# Patient Record
Sex: MALE | Race: WHITE | NOT HISPANIC OR LATINO | Employment: OTHER | URBAN - METROPOLITAN AREA
[De-identification: names, ages, dates, MRNs, and addresses within clinical notes are randomized per-mention and may not be internally consistent; named-entity substitution may affect disease eponyms.]

---

## 2019-12-10 ENCOUNTER — HOSPITAL ENCOUNTER (OUTPATIENT)
Dept: GENERAL RADIOLOGY | Facility: HOSPITAL | Age: 54
Discharge: HOME | End: 2019-12-10
Attending: FAMILY MEDICINE

## 2021-03-11 ENCOUNTER — OFFICE VISIT CONVERTED (OUTPATIENT)
Dept: FAMILY MEDICINE CLINIC | Facility: CLINIC | Age: 56
End: 2021-03-11
Attending: NURSE PRACTITIONER

## 2021-05-10 NOTE — H&P
History and Physical      Patient Name: Rivera Ku   Patient ID: [id number]   Sex: Male   YOB: 1965    Primary Care Provider: Kayce BAZZI    Visit Date: March 11, 2021    Provider: ROSE MARY Hermosillo   Location: [facility]   Location Address: [address] [place] [address], [address]  [place], [place]  [id number]   Location Phone: [phone]          Chief Complaint  · new patient establish care  · annual CPE      History Of Present Illness  Rivera Ku is a 55 year old /White male who presents for evaluation and treatment of:      Pt is here to establish care with a new provider/ annual CPE, his previous provider was Dr. Osorio, last visit was Jan 2020. He has no concerns at this time.    Labs: has been over a year, due  Flu: declined  Colonoscopy: never had, he would like to do cologuard.  No family hx of colon cancer.           Allergy List  Allergen Name Date Reaction Notes   NO KNOWN DRUG ALLERGIES --  --  --        Allergies Reconciled  Family Medical History  Disease Name Relative/Age Notes   Lung cancer Father/   --          Social History  Finding Status Start/Stop Quantity Notes   Alcohol Never --/-- --  --    Tobacco Former --/-- --  Pt chewed tobacco for 10years         Immunizations  NameDate Admin Mfg Trade Name Lot Number Route Inj VIS Given VIS Publication   InfluenzaRefused 03/11/2021 NE Not Entered  NE NE     Comments: declined         Review of Systems  · Constitutional  o Denies  o : fever, fatigue, weight loss, weight gain  · Cardiovascular  o Denies  o : lower extremity edema, claudication, chest pressure, palpitations  · Respiratory  o Denies  o : shortness of breath, wheezing, cough, hemoptysis, dyspnea on exertion  · Gastrointestinal  o Denies  o : nausea, vomiting, diarrhea, constipation, abdominal pain      Vitals  Date Time BP Position Site L\R Cuff Size HR RR TEMP (F) WT  HT  BMI kg/m2 BSA m2 O2 Sat FR L/min FiO2 HC       03/11/2021 01:33 PM  121/64 Sitting    81 - R   126lbs 8oz 5'   24.71 1.56 98 %            Physical Examination  · Constitutional  o Appearance  o : well-nourished, well developed, alert, in no acute distress  · Ears, Nose, Mouth and Throat  o Ears  o :   § External Ears  § : appearance within normal limits, no lesions present  § Otoscopic Examination  § : tympanic membrane appearance within normal limits bilaterally without perforations, mobility normal  o Nose  o :   § External Nose  § : normal stucture noted.  § Intranasal Exam  § : no swelling, reddness, turbs normal ayah.  o Oral Cavity  o :   § Oral Mucosa  § : oral mucosa normal without pallor or cyanosis  § Lips  § : lip appearance normal  § Teeth  § : normal dentition for age  § Gums  § : gums pink, non-swollen, no bleeding present  § Tongue  § : tongue appearance normal  § Palate  § : hard palate normal, soft palate appearance normal  o Throat  o :   § Oropharynx  § : no inflammation or lesions present, tonsils within normal limits  · Respiratory  o Respiratory Effort  o : breathing unlabored  o Auscultation of Lungs  o : normal breath sounds throughout  · Cardiovascular  o Heart  o :   § Auscultation of Heart  § : regular rate and rhythm, no murmurs, gallops or rubs  § Palpation of Heart  § : normal apical impulse, no cardiac thrill present  o Peripheral Vascular System  o :   § Pedal Pulses  § : pulses 2 bilaterally  § Extremities  § : no cyanosis, clubbing or edema; less than 2 second refill noted  · Gastrointestinal  o Abdominal Examination  o : abdomen nontender to palpation, tone normal without rigidity or guarding, no masses present, abdominal contour scaphoid  o Liver and spleen  o : no hepatomegaly present, liver nontender to palpation, spleen not palpable  · Skin and Subcutaneous Tissue  o General Inspection  o : no rashes or lesions present, no areas of discoloration  · Neurologic  o Mental Status Examination  o :   § Orientation  § : grossly oriented to person,  place and time  o Cranial Nerves  o : cranial nerves intact and symmetric throughout  · Psychiatric  o Mood and Affect  o : mood normal, affect appropriate, denies any SI/HI          Assessment  · Annual physical exam     V70.0/Z00.00  · Screening for depression     V79.0/Z13.89  · Screening for lipid disorders     V77.91/Z13.220  · Screening for colon cancer     V76.51/Z12.11  · Screening for prostate cancer     V76.44/Z12.5  · Establishing care with new doctor, encounter for     V65.8/Z76.89  · Screening for thyroid disorder     V77.0/Z13.29    Problems Reconciled  Plan  · Orders  o ACO-18: Negative screen for clinical depression using a standardized tool () - V79.0/Z13.89 - 03/11/2021  o Cologuard (53521) - V76.51/Z12.11 - 03/11/2021  o PSA Ultrasensitive, ANNUAL SCREENING [facility] ([id number], ) - V76.44/Z12.5 - 03/11/2021  o CBC with Auto Diff [facility] ([id number]) - V65.8/Z76.89 - 03/11/2021  o CMP [facility] ([id number]) - V65.8/Z76.89 - 03/11/2021  o Lipid Panel [facility] ([id number]) - V77.91/Z13.220 - 03/11/2021  o Thyroid Profile (73581, 69497, THYII) - V77.0/Z13.29 - 03/11/2021  o ACO-14: Influenza immunization was not administered for reasons documented [facility] () - - 03/11/2021   declined  o ACO-39: Current medications updated and reviewed (, 1159F) - - 03/11/2021  · Medications  o Medications have been Reconciled  o Transition of Care or Provider Policy  · Instructions  o Reviewed health maintenance flowsheet and updated information. Orders were placed and/or patient's response was documented.  o Depression Screen completed and scanned into the EMR under the designated folder within the patient's documents.  o Today's PHQ-9 result is 0  o The provider screening met the required time of 15 minutes.  o Patient was educated/instructed on their diagnosis, treatment and medications prior to discharge from the clinic today.  o Patient instructed to seek medical attention urgently for new or worsening symptoms.  o Call the office with  any concerns or questions.  o Flu vaccine declined.  · Disposition  o Return Visit Request in/on 1 month +/- 2 weeks ([id number]).            Electronically Signed by: ROSE MARY Hermosillo -Author on March 11, 2021 01:54:46 PM

## 2021-05-14 VITALS
OXYGEN SATURATION: 98 % | HEIGHT: 60 IN | BODY MASS INDEX: 24.84 KG/M2 | WEIGHT: 126.5 LBS | DIASTOLIC BLOOD PRESSURE: 64 MMHG | SYSTOLIC BLOOD PRESSURE: 121 MMHG | HEART RATE: 81 BPM

## 2022-03-14 ENCOUNTER — OFFICE VISIT (OUTPATIENT)
Dept: FAMILY MEDICINE CLINIC | Facility: CLINIC | Age: 57
End: 2022-03-14

## 2022-03-14 VITALS
WEIGHT: 127 LBS | SYSTOLIC BLOOD PRESSURE: 109 MMHG | HEART RATE: 77 BPM | DIASTOLIC BLOOD PRESSURE: 72 MMHG | OXYGEN SATURATION: 96 % | HEIGHT: 60 IN | BODY MASS INDEX: 24.94 KG/M2

## 2022-03-14 DIAGNOSIS — Z11.59 NEED FOR HEPATITIS C SCREENING TEST: ICD-10-CM

## 2022-03-14 DIAGNOSIS — Z12.5 SCREENING FOR PROSTATE CANCER: ICD-10-CM

## 2022-03-14 DIAGNOSIS — Z12.11 SCREEN FOR COLON CANCER: ICD-10-CM

## 2022-03-14 DIAGNOSIS — Z00.00 ANNUAL PHYSICAL EXAM: Primary | ICD-10-CM

## 2022-03-14 PROCEDURE — 99396 PREV VISIT EST AGE 40-64: CPT | Performed by: NURSE PRACTITIONER

## 2022-03-14 NOTE — PROGRESS NOTES
"Chief Complaint  Annual Exam    Subjective            Rivera Ku presents to [facility]  Pt is here for an annual CPE. No issues to report at this time.     Pt is due labs.    Pt is due colonoscopy. Pt prefers cologuard.         History reviewed. No pertinent past medical history.    No Known Allergies     History reviewed. No pertinent surgical history.     Social History     Tobacco Use   • Smoking status: Never Smoker   • Smokeless tobacco: Former User     Types: Chew   • Tobacco comment: FOR 10 YEARS   Substance Use Topics   • Alcohol use: Never       Family History   Problem Relation Age of Onset   • Lung cancer Father         No current outpatient medications on file prior to visit.     No current facility-administered medications on file prior to visit.       Health Maintenance Due   Topic Date Due   • COLORECTAL CANCER SCREENING  Never done   • ANNUAL PHYSICAL  Never done   • TDAP/TD VACCINES (1 - Tdap) Never done   • HEPATITIS C SCREENING  Never done       Objective     /72   Pulse 77   Ht 152.4 cm (60\")   Wt 57.6 kg (127 lb)   SpO2 96%   BMI 24.80 kg/m²       Physical Exam  Constitutional:       General: He is not in acute distress.     Appearance: Normal appearance. He is not ill-appearing.   HENT:      Head: Normocephalic and atraumatic.      Mouth/Throat:      Pharynx: No oropharyngeal exudate or posterior oropharyngeal erythema.   Cardiovascular:      Rate and Rhythm: Normal rate and regular rhythm.      Heart sounds: Normal heart sounds. No murmur heard.  Pulmonary:      Effort: Pulmonary effort is normal. No respiratory distress.      Breath sounds: Normal breath sounds.   Chest:      Chest wall: No tenderness.   Abdominal:      General: Abdomen is flat. Bowel sounds are normal. There is no distension.      Palpations: Abdomen is soft. There is no mass.      Tenderness: There is no abdominal tenderness. There is no guarding.   Musculoskeletal:         " General: No swelling or tenderness. Normal range of motion.      Cervical back: Normal range of motion and neck supple.   Skin:     General: Skin is warm and dry.      Findings: No rash.   Neurological:      General: No focal deficit present.      Mental Status: He is alert and oriented to person, place, and time. Mental status is at baseline.      Gait: Gait normal.   Psychiatric:         Mood and Affect: Mood normal.         Behavior: Behavior normal.         Thought Content: Thought content normal.         Judgment: Judgment normal.           Result Review :                           Assessment and Plan        Diagnoses and all orders for this visit:    1. Annual physical exam (Primary)  -     CBC w AUTO Differential; Future  -     Comprehensive metabolic panel; Future  -     Lipid panel; Future  -     TSH; Future  -     T4, free; Future  -     Cologuard - Stool, Per Rectum; Future    2. Screen for colon cancer  -     Cologuard - Stool, Per Rectum; Future    3. Screening for prostate cancer  -     PSA SCREENING; Future    4. Need for hepatitis C screening test  -     Hepatitis panel, acute; Future      Preventative Counseling:  Healthy diet  Daily exercise  Get adequate sleep        Follow Up     Return in about 1 year (around 3/14/2023) for Annual physical.    Patient was given instructions and counseling regarding his condition or for health maintenance advice. Please see specific information pulled into the AVS if appropriate.     Rivera SEAY Kings  reports that he has never smoked. He has quit using smokeless tobacco.  His smokeless tobacco use included chew..

## 2022-04-13 ENCOUNTER — LAB (OUTPATIENT)
Dept: LAB | Facility: HOSPITAL | Age: 57
End: 2022-04-13

## 2022-04-13 DIAGNOSIS — Z12.5 SCREENING FOR PROSTATE CANCER: ICD-10-CM

## 2022-04-13 DIAGNOSIS — Z00.00 ANNUAL PHYSICAL EXAM: ICD-10-CM

## 2022-04-13 DIAGNOSIS — Z11.59 NEED FOR HEPATITIS C SCREENING TEST: ICD-10-CM

## 2022-04-13 LAB
ALBUMIN SERPL-MCNC: 4.2 G/DL (ref 3.5–5.2)
ALBUMIN/GLOB SERPL: 1.4 G/DL
ALP SERPL-CCNC: 81 U/L (ref 39–117)
ALT SERPL W P-5'-P-CCNC: 21 U/L (ref 1–41)
ANION GAP SERPL CALCULATED.3IONS-SCNC: 10.7 MMOL/L (ref 5–15)
AST SERPL-CCNC: 16 U/L (ref 1–40)
BASOPHILS # BLD AUTO: 0.08 10*3/MM3 (ref 0–0.2)
BASOPHILS NFR BLD AUTO: 1.4 % (ref 0–1.5)
BILIRUB SERPL-MCNC: 0.6 MG/DL (ref 0–1.2)
BUN SERPL-MCNC: 15 MG/DL (ref 6–20)
BUN/CREAT SERPL: 13.5 (ref 7–25)
CALCIUM SPEC-SCNC: 9.2 MG/DL (ref 8.6–10.5)
CHLORIDE SERPL-SCNC: 103 MMOL/L (ref 98–107)
CHOLEST SERPL-MCNC: 235 MG/DL (ref 0–200)
CO2 SERPL-SCNC: 26.3 MMOL/L (ref 22–29)
CREAT SERPL-MCNC: 1.11 MG/DL (ref 0.76–1.27)
DEPRECATED RDW RBC AUTO: 41.7 FL (ref 37–54)
EGFRCR SERPLBLD CKD-EPI 2021: 77 ML/MIN/1.73
EOSINOPHIL # BLD AUTO: 0.34 10*3/MM3 (ref 0–0.4)
EOSINOPHIL NFR BLD AUTO: 6.2 % (ref 0.3–6.2)
ERYTHROCYTE [DISTWIDTH] IN BLOOD BY AUTOMATED COUNT: 12.8 % (ref 12.3–15.4)
GLOBULIN UR ELPH-MCNC: 3 GM/DL
GLUCOSE SERPL-MCNC: 90 MG/DL (ref 65–99)
HAV IGM SERPL QL IA: NORMAL
HBV CORE IGM SERPL QL IA: NORMAL
HBV SURFACE AG SERPL QL IA: NORMAL
HCT VFR BLD AUTO: 48.6 % (ref 37.5–51)
HCV AB SER DONR QL: NORMAL
HDLC SERPL-MCNC: 56 MG/DL (ref 40–60)
HGB BLD-MCNC: 16 G/DL (ref 13–17.7)
IMM GRANULOCYTES # BLD AUTO: 0.01 10*3/MM3 (ref 0–0.05)
IMM GRANULOCYTES NFR BLD AUTO: 0.2 % (ref 0–0.5)
LDLC SERPL CALC-MCNC: 167 MG/DL (ref 0–100)
LDLC/HDLC SERPL: 2.95 {RATIO}
LYMPHOCYTES # BLD AUTO: 1.81 10*3/MM3 (ref 0.7–3.1)
LYMPHOCYTES NFR BLD AUTO: 32.8 % (ref 19.6–45.3)
MCH RBC QN AUTO: 29.7 PG (ref 26.6–33)
MCHC RBC AUTO-ENTMCNC: 32.9 G/DL (ref 31.5–35.7)
MCV RBC AUTO: 90.2 FL (ref 79–97)
MONOCYTES # BLD AUTO: 0.61 10*3/MM3 (ref 0.1–0.9)
MONOCYTES NFR BLD AUTO: 11.1 % (ref 5–12)
NEUTROPHILS NFR BLD AUTO: 2.67 10*3/MM3 (ref 1.7–7)
NEUTROPHILS NFR BLD AUTO: 48.3 % (ref 42.7–76)
NRBC BLD AUTO-RTO: 0 /100 WBC (ref 0–0.2)
PLATELET # BLD AUTO: 236 10*3/MM3 (ref 140–450)
PMV BLD AUTO: 11.4 FL (ref 6–12)
POTASSIUM SERPL-SCNC: 3.9 MMOL/L (ref 3.5–5.2)
PROT SERPL-MCNC: 7.2 G/DL (ref 6–8.5)
PSA SERPL-MCNC: 4.29 NG/ML (ref 0–4)
RBC # BLD AUTO: 5.39 10*6/MM3 (ref 4.14–5.8)
SODIUM SERPL-SCNC: 140 MMOL/L (ref 136–145)
T4 FREE SERPL-MCNC: 1.22 NG/DL (ref 0.93–1.7)
TRIGL SERPL-MCNC: 68 MG/DL (ref 0–150)
TSH SERPL DL<=0.05 MIU/L-ACNC: 2.99 UIU/ML (ref 0.27–4.2)
VLDLC SERPL-MCNC: 12 MG/DL (ref 5–40)
WBC NRBC COR # BLD: 5.52 10*3/MM3 (ref 3.4–10.8)

## 2022-04-13 PROCEDURE — 80050 GENERAL HEALTH PANEL: CPT

## 2022-04-13 PROCEDURE — 36415 COLL VENOUS BLD VENIPUNCTURE: CPT

## 2022-04-13 PROCEDURE — 80061 LIPID PANEL: CPT

## 2022-04-13 PROCEDURE — 84439 ASSAY OF FREE THYROXINE: CPT

## 2022-04-13 PROCEDURE — 80074 ACUTE HEPATITIS PANEL: CPT

## 2022-04-13 PROCEDURE — G0103 PSA SCREENING: HCPCS

## 2022-04-14 ENCOUNTER — TELEPHONE (OUTPATIENT)
Dept: FAMILY MEDICINE CLINIC | Facility: CLINIC | Age: 57
End: 2022-04-14

## 2022-04-14 DIAGNOSIS — R97.20 ELEVATED PSA: ICD-10-CM

## 2022-04-14 DIAGNOSIS — E78.2 MIXED HYPERLIPIDEMIA: Primary | ICD-10-CM

## 2022-04-14 RX ORDER — ROSUVASTATIN CALCIUM 10 MG/1
10 TABLET, COATED ORAL DAILY
Qty: 30 TABLET | Refills: 2 | Status: SHIPPED | OUTPATIENT
Start: 2022-04-14 | End: 2022-07-15 | Stop reason: SDUPTHER

## 2022-04-14 NOTE — TELEPHONE ENCOUNTER
----- Message from ROSE MARY Hermosillo sent at 4/14/2022  6:39 AM EDT -----  PSA elevated consult Urology, also bad chol is way too high recommned statin therapy 10mg crestor q day repeat lipid panel with cmp in 4-6 months, encourage low chol diet and daily cardio exercise

## 2022-05-16 ENCOUNTER — OFFICE VISIT (OUTPATIENT)
Dept: UROLOGY | Facility: CLINIC | Age: 57
End: 2022-05-16

## 2022-05-16 VITALS — RESPIRATION RATE: 18 BRPM | HEART RATE: 79 BPM | BODY MASS INDEX: 24.54 KG/M2 | WEIGHT: 125 LBS | HEIGHT: 60 IN

## 2022-05-16 DIAGNOSIS — R35.1 NOCTURIA: ICD-10-CM

## 2022-05-16 DIAGNOSIS — R97.20 ELEVATED PROSTATE SPECIFIC ANTIGEN (PSA): Primary | ICD-10-CM

## 2022-05-16 LAB
BILIRUB BLD-MCNC: NEGATIVE MG/DL
CLARITY, POC: CLEAR
COLOR UR: YELLOW
EXPIRATION DATE: NORMAL
GLUCOSE UR STRIP-MCNC: NEGATIVE MG/DL
KETONES UR QL: NEGATIVE
LEUKOCYTE EST, POC: NEGATIVE
Lab: NORMAL
NITRITE UR-MCNC: NEGATIVE MG/ML
PH UR: 6 [PH] (ref 5–8)
PROT UR STRIP-MCNC: NEGATIVE MG/DL
RBC # UR STRIP: NEGATIVE /UL
SP GR UR: 1.02 (ref 1–1.03)
SPECIMEN VOL SMN: 17 ML
UROBILINOGEN UR QL: NORMAL

## 2022-05-16 PROCEDURE — 99213 OFFICE O/P EST LOW 20 MIN: CPT | Performed by: NURSE PRACTITIONER

## 2022-05-16 PROCEDURE — 51798 US URINE CAPACITY MEASURE: CPT | Performed by: NURSE PRACTITIONER

## 2022-05-16 PROCEDURE — 81003 URINALYSIS AUTO W/O SCOPE: CPT | Performed by: NURSE PRACTITIONER

## 2022-05-17 NOTE — PROGRESS NOTES
Chief Complaint: Elevated PSA    Subjective      Elevated psa       History of Present Illness  Rivera Ku is a 56 y.o. male presents to [facility] for elevated psa.    Patient presents today on referral from Kayce Simmons for an elevated PSA.    Patient denies any urinary frequency, urgency or dysuria.    Denies any gross hematuria.    Admits to nocturia 1X/night.    Denies slow urinary stream or spitting.    Denies any penis or scrotum pain.  Denies any ED issues.    Denies family history of prostate cancer.    Denies taking any blood thinners.    Previous psa's:  4/22: 4.29    Results for orders placed or performed in visit on 05/16/22   Bladder Scan   Result Value Ref Range    Volume: 17    POC Urinalysis Dipstick, Automated    Specimen: Urine   Result Value Ref Range    Color Yellow Yellow, Straw, Dark Yellow, Christina    Clarity, UA Clear Clear    Specific Gravity  1.020 1.005 - 1.030    pH, Urine 6.0 5.0 - 8.0    Leukocytes Negative Negative    Nitrite, UA Negative Negative    Protein, POC Negative Negative mg/dL    Glucose, UA Negative Negative, 1000 mg/dL (3+) mg/dL    Ketones, UA Negative Negative    Urobilinogen, UA Normal Normal    Bilirubin Negative Negative    Blood, UA Negative Negative    Lot Number [id number]     Expiration Date 2,023-5          Objective     Past Medical History:   Diagnosis Date   • Hyperlipidemia        History reviewed. No pertinent surgical history.    Outpatient Medications Marked as Taking for the 5/16/22 encounter (Office Visit) with Sarah Chandler APRN   Medication Sig Dispense Refill   • rosuvastatin (Crestor) 10 MG tablet Take 1 tablet by mouth Daily. 30 tablet 2       No Known Allergies     Family History   Problem Relation Age of Onset   • Lung cancer Father        Social History     Socioeconomic History   • Marital status:    Tobacco Use   • Smoking status: Never Smoker   • Smokeless tobacco: Former User     Types: Chew   • Tobacco  "comment: FOR 10 YEARS   Vaping Use   • Vaping Use: Never used   Substance and Sexual Activity   • Alcohol use: Never   • Drug use: Never   • Sexual activity: Defer       Review of Systems   Constitutional: Negative for chills and fever.   Genitourinary: Positive for nocturia. Negative for decreased urine volume, difficulty urinating, discharge, dysuria, flank pain, frequency, genital sores, hematuria, penile pain, erectile dysfunction, penile swelling, scrotal swelling, testicular pain, urgency and urinary incontinence.        Vital Signs:   Pulse 79   Resp 18   Ht 152.4 cm (60\")   Wt 56.7 kg (125 lb)   BMI 24.41 kg/m²      Physical Exam  Constitutional:       Appearance: Normal appearance.   HENT:      Head: Normocephalic.   Cardiovascular:      Rate and Rhythm: Normal rate.   Pulmonary:      Effort: Pulmonary effort is normal.   Abdominal:      General: Abdomen is flat.      Palpations: Abdomen is soft.   Genitourinary:     Comments: Bladder Scan interpretation     Estimation of residual urine via [employer] Bladder Scan  MA/nurse performing: Crystal- CMA  Residual Urine: 17 ml  Indication: Elevated prostate specific antigen (PSA)  (primary encounter diagnosis)   Position: Supine  Examination: Incremental scanning of the suprapubic area using 2.0 MHz transducer using copious amounts of acoustic gel.   Findings: An anechoic area was demonstrated which represented the bladder, with measurement of residual urine as noted. I inspected this myself. In that the residual urine was stable, refer to plan for treatment and plan necessary at this time.         Skin:     General: Skin is warm and dry.   Neurological:      General: No focal deficit present.      Mental Status: He is alert and oriented to person, place, and time.   Psychiatric:         Mood and Affect: Mood normal.         Thought Content: Thought content normal.          Result Review :          [x]  Laboratory  []  Radiology  []  Pathology  []  " Microbiology  []  EKG/Telemetry   []  Cardiology/Vascular   [x]  Old records  Today I reviewed Kayce Simmons's previous office note along with previous PSAs.     Assessment and Plan    Diagnoses and all orders for this visit:    1. Elevated prostate specific antigen (PSA) (Primary)  -     Bladder Scan  -     POC Urinalysis Dipstick, Automated  -     PSA Diagnostic; Future    2. Nocturia        Follow Up   Return for Follow-up with me in 2 months; PSA prior to the visit..     BPH with Luts- behavioral modifications including fluid management, limiting fluids prior to sleep, and voiding immediately prior to sleep.     Patient was given instructions and counseling regarding his condition or for health maintenance advice. Please see specific information pulled into the AVS if appropriate.

## 2022-06-29 ENCOUNTER — LAB (OUTPATIENT)
Dept: LAB | Facility: HOSPITAL | Age: 57
End: 2022-06-29

## 2022-06-29 DIAGNOSIS — R97.20 ELEVATED PROSTATE SPECIFIC ANTIGEN (PSA): ICD-10-CM

## 2022-06-29 LAB — PSA SERPL-MCNC: 3.58 NG/ML (ref 0–4)

## 2022-06-29 PROCEDURE — 84153 ASSAY OF PSA TOTAL: CPT

## 2022-06-29 PROCEDURE — 36415 COLL VENOUS BLD VENIPUNCTURE: CPT

## 2022-07-06 ENCOUNTER — OFFICE VISIT (OUTPATIENT)
Dept: UROLOGY | Facility: CLINIC | Age: 57
End: 2022-07-06

## 2022-07-06 VITALS — RESPIRATION RATE: 16 BRPM | WEIGHT: 128 LBS | HEART RATE: 76 BPM | HEIGHT: 60 IN | BODY MASS INDEX: 25.13 KG/M2

## 2022-07-06 DIAGNOSIS — R97.20 ELEVATED PROSTATE SPECIFIC ANTIGEN (PSA): Primary | ICD-10-CM

## 2022-07-06 PROCEDURE — 99212 OFFICE O/P EST SF 10 MIN: CPT | Performed by: NURSE PRACTITIONER

## 2022-07-06 PROCEDURE — 51798 US URINE CAPACITY MEASURE: CPT | Performed by: NURSE PRACTITIONER

## 2022-07-06 NOTE — PROGRESS NOTES
Chief Complaint: Elevated PSA    Subjective      Elevated psa       History of Present Illness  Rivera Ku is a 56 y.o. male presents to [facility] for elevated psa.    Patient presents today on referral from Kayce Simmons for an elevated PSA.    Patient denies any urinary frequency, urgency or dysuria.    Denies any gross hematuria.    Admits to nocturia 1X/night.    Denies slow urinary stream or spitting.    Denies any penis or scrotum pain.  Denies any ED issues.    Denies family history of prostate cancer.    Denies taking any blood thinners.    Previous psa's:  4/22: 4.29      *Update* 7/6/22:    Patient presents today for follow-up visit after repeating his PSA.    PSA is down 3.58 from 4.29.    Patient denies any urinary frequency, urgency or dysuria.    Denies any gross hematuria.    Admits to occasional nocturia.    Results for orders placed or performed in visit on 06/29/22   PSA Diagnostic    Specimen: Blood   Result Value Ref Range    PSA 3.580 0.000 - 4.000 ng/mL     Previous psa's:  6/22: 3.58  4/22: 4.29    Objective     Past Medical History:   Diagnosis Date   • Hyperlipidemia        History reviewed. No pertinent surgical history.    Outpatient Medications Marked as Taking for the 7/6/22 encounter (Office Visit) with Sarah Chandler APRN   Medication Sig Dispense Refill   • rosuvastatin (Crestor) 10 MG tablet Take 1 tablet by mouth Daily. 30 tablet 2       No Known Allergies     Family History   Problem Relation Age of Onset   • Lung cancer Father        Social History     Socioeconomic History   • Marital status:    Tobacco Use   • Smoking status: Never Smoker   • Smokeless tobacco: Former User     Types: Chew   • Tobacco comment: FOR 10 YEARS   Vaping Use   • Vaping Use: Never used   Substance and Sexual Activity   • Alcohol use: Never   • Drug use: Never   • Sexual activity: Defer       Review of Systems   Constitutional: Negative for chills and fever.  "  Genitourinary: Positive for nocturia. Negative for decreased urine volume, difficulty urinating, discharge, dysuria, flank pain, frequency, genital sores, hematuria, penile pain, erectile dysfunction, penile swelling, scrotal swelling, testicular pain, urgency and urinary incontinence.        Vital Signs:   Pulse 76   Resp 16   Ht 152.4 cm (60\")   Wt 58.1 kg (128 lb)   BMI 25.00 kg/m²      Physical Exam  Constitutional:       Appearance: Normal appearance.   HENT:      Head: Normocephalic.   Cardiovascular:      Rate and Rhythm: Normal rate.   Pulmonary:      Effort: Pulmonary effort is normal.   Abdominal:      General: Abdomen is flat.      Palpations: Abdomen is soft.   Genitourinary:     Comments: Bladder Scan interpretation     Estimation of residual urine via [employer] Bladder Scan  MA/nurse performing: Crystal- CMA  Residual Urine: 17 ml  Indication: Elevated prostate specific antigen (PSA)  (primary encounter diagnosis)   Position: Supine  Examination: Incremental scanning of the suprapubic area using 2.0 MHz transducer using copious amounts of acoustic gel.   Findings: An anechoic area was demonstrated which represented the bladder, with measurement of residual urine as noted. I inspected this myself. In that the residual urine was stable, refer to plan for treatment and plan necessary at this time.         Skin:     General: Skin is warm and dry.   Neurological:      General: No focal deficit present.      Mental Status: He is alert and oriented to person, place, and time.   Psychiatric:         Mood and Affect: Mood normal.         Thought Content: Thought content normal.          Result Review :   [x]  Laboratory  []  Radiology  []  Pathology  []  Microbiology  []  EKG/Telemetry   []  Cardiology/Vascular   [x]  Old records  Today I reviewed Kayce Simmons's previous office note along with previous PSAs.     Assessment and Plan    Diagnoses and all orders for this visit:    1. Elevated " prostate specific antigen (PSA) (Primary)  -     PSA Diagnostic; Future        Follow Up   Return for Follow-up with me in 6 months; psa prior to the visit.     BPH with Luts- behavioral modifications including fluid management, limiting fluids prior to sleep, and voiding immediately prior to sleep.     Patient was given instructions and counseling regarding his condition or for health maintenance advice. Please see specific information pulled into the AVS if appropriate.

## 2022-07-15 DIAGNOSIS — E78.2 MIXED HYPERLIPIDEMIA: ICD-10-CM

## 2022-07-15 RX ORDER — ROSUVASTATIN CALCIUM 10 MG/1
10 TABLET, COATED ORAL DAILY
Qty: 30 TABLET | Refills: 2 | Status: SHIPPED | OUTPATIENT
Start: 2022-07-15 | End: 2022-10-18

## 2022-07-15 NOTE — TELEPHONE ENCOUNTER
Caller: OANH VINCENT    Relationship: Emergency Contact    Best call back number: [phone]    Requested Prescriptions:   Requested Prescriptions     Pending Prescriptions Disp Refills   • rosuvastatin (Crestor) 10 MG tablet 30 tablet 2     Sig: Take 1 tablet by mouth Daily.        [facility] be sent[facility] [id number] - [phone]  - [phone] FX     Does the patient have less than a 3 day supply:  [x] Yes  [] No    Ben Shelley Rep   07/15/22 11:29 EDT

## 2022-08-23 ENCOUNTER — TELEPHONE (OUTPATIENT)
Dept: FAMILY MEDICINE CLINIC | Facility: CLINIC | Age: 57
End: 2022-08-23

## 2022-09-08 ENCOUNTER — LAB (OUTPATIENT)
Dept: LAB | Facility: HOSPITAL | Age: 57
End: 2022-09-08

## 2022-09-08 DIAGNOSIS — E78.2 MIXED HYPERLIPIDEMIA: ICD-10-CM

## 2022-09-08 LAB
ALBUMIN SERPL-MCNC: 4.4 G/DL (ref 3.5–5.2)
ALBUMIN/GLOB SERPL: 1.8 G/DL
ALP SERPL-CCNC: 86 U/L (ref 39–117)
ALT SERPL W P-5'-P-CCNC: 24 U/L (ref 1–41)
ANION GAP SERPL CALCULATED.3IONS-SCNC: 10 MMOL/L (ref 5–15)
AST SERPL-CCNC: 25 U/L (ref 1–40)
BILIRUB SERPL-MCNC: 0.6 MG/DL (ref 0–1.2)
BUN SERPL-MCNC: 19 MG/DL (ref 6–20)
BUN/CREAT SERPL: 16 (ref 7–25)
CALCIUM SPEC-SCNC: 9.2 MG/DL (ref 8.6–10.5)
CHLORIDE SERPL-SCNC: 106 MMOL/L (ref 98–107)
CHOLEST SERPL-MCNC: 169 MG/DL (ref 0–200)
CO2 SERPL-SCNC: 26 MMOL/L (ref 22–29)
CREAT SERPL-MCNC: 1.19 MG/DL (ref 0.76–1.27)
EGFRCR SERPLBLD CKD-EPI 2021: 70.4 ML/MIN/1.73
GLOBULIN UR ELPH-MCNC: 2.5 GM/DL
GLUCOSE SERPL-MCNC: 80 MG/DL (ref 65–99)
HDLC SERPL-MCNC: 60 MG/DL (ref 40–60)
LDLC SERPL CALC-MCNC: 95 MG/DL (ref 0–100)
LDLC/HDLC SERPL: 1.58 {RATIO}
POTASSIUM SERPL-SCNC: 4.3 MMOL/L (ref 3.5–5.2)
PROT SERPL-MCNC: 6.9 G/DL (ref 6–8.5)
SODIUM SERPL-SCNC: 142 MMOL/L (ref 136–145)
TRIGL SERPL-MCNC: 72 MG/DL (ref 0–150)
VLDLC SERPL-MCNC: 14 MG/DL (ref 5–40)

## 2022-09-08 PROCEDURE — 80061 LIPID PANEL: CPT

## 2022-09-08 PROCEDURE — 80053 COMPREHEN METABOLIC PANEL: CPT

## 2022-09-08 PROCEDURE — 36415 COLL VENOUS BLD VENIPUNCTURE: CPT

## 2022-09-14 ENCOUNTER — OFFICE VISIT (OUTPATIENT)
Dept: FAMILY MEDICINE CLINIC | Facility: CLINIC | Age: 57
End: 2022-09-14

## 2022-09-14 VITALS
BODY MASS INDEX: 25.13 KG/M2 | WEIGHT: 128 LBS | HEIGHT: 60 IN | SYSTOLIC BLOOD PRESSURE: 123 MMHG | DIASTOLIC BLOOD PRESSURE: 76 MMHG | HEART RATE: 77 BPM | OXYGEN SATURATION: 96 %

## 2022-09-14 DIAGNOSIS — Z12.11 SCREEN FOR COLON CANCER: ICD-10-CM

## 2022-09-14 DIAGNOSIS — R41.3 MEMORY DEFICIT: ICD-10-CM

## 2022-09-14 DIAGNOSIS — H54.7 VISION PROBLEM: Primary | ICD-10-CM

## 2022-09-14 DIAGNOSIS — R51.9 ACUTE NONINTRACTABLE HEADACHE, UNSPECIFIED HEADACHE TYPE: ICD-10-CM

## 2022-09-14 PROCEDURE — 99214 OFFICE O/P EST MOD 30 MIN: CPT | Performed by: NURSE PRACTITIONER

## 2022-09-14 NOTE — PROGRESS NOTES
"Chief Complaint  Eye Problem (Random bouts of blurry vision off and on. )memory issues    Subjective            Rivera Ku presents to [facility]  History of Present Illness  Pt has c/o vision changes. PT complains of \"episodes\" ongoing for 20 years happening more and more frequently lately.  Pt states at times, his vision become blurry/out of focus. When this happens, pt states it lasts no longer than 5 minutes. Pt states this happens sporadically. At times, pt will have a tingling sensation across his head and ears. Other symptoms he experiences when this occurs, is H/A, body pains, fatigue, but is not all the time when the episodes happen.  Pt reports he does have trouble remembering things at times.  Pt has not visited with his eye doctor. PT does wear glasses and sees Dr. Warner and will make an appt to f/u with him.    Pt never received cologuard. Will re order today.         Past Medical History:   Diagnosis Date   • Hyperlipidemia        No Known Allergies     History reviewed. No pertinent surgical history.     Social History     Tobacco Use   • Smoking status: Never Smoker   • Smokeless tobacco: Former User     Types: Chew   • Tobacco comment: FOR 10 YEARS   Substance Use Topics   • Alcohol use: Never       Family History   Problem Relation Age of Onset   • Lung cancer Father         Current Outpatient Medications on File Prior to Visit   Medication Sig   • rosuvastatin (Crestor) 10 MG tablet Take 1 tablet by mouth Daily.     No current facility-administered medications on file prior to visit.       Health Maintenance Due   Topic Date Due   • COLORECTAL CANCER SCREENING  Never done   • TDAP/TD VACCINES (1 - Tdap) Never done   • ZOSTER VACCINE (1 of 2) Never done   • COVID-19 Vaccine (3 - Booster for Moderna series) 09/21/2021       Objective     /76 (BP Location: Right arm, Patient Position: Sitting, Cuff Size: Adult)   Pulse 77   Ht 152.4 cm (60\")   Wt " 58.1 kg (128 lb)   SpO2 96%   BMI 25.00 kg/m²       Physical Exam  Constitutional:       General: He is not in acute distress.     Appearance: Normal appearance. He is not ill-appearing.   HENT:      Head: Normocephalic and atraumatic.      Right Ear: Tympanic membrane, ear canal and external ear normal.      Left Ear: Tympanic membrane, ear canal and external ear normal.   Eyes:      Extraocular Movements: Extraocular movements intact.      Conjunctiva/sclera: Conjunctivae normal.      Pupils: Pupils are equal, round, and reactive to light.   Cardiovascular:      Rate and Rhythm: Normal rate and regular rhythm.      Heart sounds: Normal heart sounds. No murmur heard.  Pulmonary:      Effort: Pulmonary effort is normal. No respiratory distress.      Breath sounds: Normal breath sounds.   Chest:      Chest wall: No tenderness.   Abdominal:      General: There is no distension.      Palpations: There is no mass.      Tenderness: There is no abdominal tenderness. There is no guarding.   Musculoskeletal:         General: No swelling or tenderness. Normal range of motion.      Cervical back: Normal range of motion and neck supple.   Skin:     General: Skin is warm and dry.      Findings: No rash.   Neurological:      General: No focal deficit present.      Mental Status: He is alert and oriented to person, place, and time. Mental status is at baseline.      Cranial Nerves: Cranial nerves are intact.      Sensory: Sensation is intact.      Motor: Motor function is intact.      Coordination: Coordination is intact.      Gait: Gait is intact. Gait normal.   Psychiatric:         Attention and Perception: Attention normal.         Mood and Affect: Mood normal.         Speech: Speech normal.         Behavior: Behavior normal. Behavior is cooperative.         Thought Content: Thought content normal.         Cognition and Memory: Cognition and memory normal.         Judgment: Judgment normal.           Result Review :                            Assessment and Plan        Diagnoses and all orders for this visit:    1. Vision problem (Primary)  Comments:  pt to make and appt with Dr. Warner for an eye exam  Orders:  -     MRI Brain With & Without Contrast; Future    2. Screen for colon cancer  -     Cologuard - Stool, Per Rectum; Future    3. Memory deficit  -     MRI Brain With & Without Contrast; Future    4. Acute nonintractable headache, unspecified headache type  -     MRI Brain With & Without Contrast; Future          I spent 30 minutes caring for Rivera on this date of service. This time includes time spent by me in the following activities:preparing for the visit, obtaining and/or reviewing a separately obtained history, performing a medically appropriate examination and/or evaluation , ordering medications, tests, or procedures and documenting information in the medical record    Follow Up     Return if symptoms worsen or fail to improve.    Patient was given instructions and counseling regarding his condition or for health maintenance advice. Please see specific information pulled into the AVS if appropriate.     Rivera Ku  reports that he has never smoked. He has quit using smokeless tobacco.  His smokeless tobacco use included chew.

## 2022-10-11 ENCOUNTER — TELEPHONE (OUTPATIENT)
Dept: FAMILY MEDICINE CLINIC | Facility: CLINIC | Age: 57
End: 2022-10-11

## 2022-10-11 ENCOUNTER — HOSPITAL ENCOUNTER (OUTPATIENT)
Dept: MRI IMAGING | Facility: HOSPITAL | Age: 57
Discharge: HOME | End: 2022-10-11
Admitting: NURSE PRACTITIONER

## 2022-10-11 DIAGNOSIS — R41.3 MEMORY DEFICIT: ICD-10-CM

## 2022-10-11 DIAGNOSIS — H54.7 VISION PROBLEM: ICD-10-CM

## 2022-10-11 DIAGNOSIS — R51.9 ACUTE NONINTRACTABLE HEADACHE, UNSPECIFIED HEADACHE TYPE: ICD-10-CM

## 2022-10-11 DIAGNOSIS — R90.89 ABNORMAL BRAIN MRI: ICD-10-CM

## 2022-10-11 DIAGNOSIS — H54.7 VISION PROBLEM: Primary | ICD-10-CM

## 2022-10-11 PROCEDURE — 0 GADOBENATE DIMEGLUMINE 529 MG/ML SOLUTION: Performed by: NURSE PRACTITIONER

## 2022-10-11 PROCEDURE — A9577 INJ MULTIHANCE: HCPCS | Performed by: NURSE PRACTITIONER

## 2022-10-11 PROCEDURE — 70553 MRI BRAIN STEM W/O & W/DYE: CPT

## 2022-10-11 RX ADMIN — GADOBENATE DIMEGLUMINE 11 ML: 529 INJECTION, SOLUTION INTRAVENOUS at 08:11

## 2022-10-11 NOTE — TELEPHONE ENCOUNTER
----- Message from ROSE MARY Hermosillo sent at 10/11/2022  1:36 PM EDT -----  Consult neurology for evaluation

## 2022-10-18 DIAGNOSIS — E78.2 MIXED HYPERLIPIDEMIA: ICD-10-CM

## 2022-10-18 RX ORDER — ROSUVASTATIN CALCIUM 10 MG/1
TABLET, COATED ORAL
Qty: 90 TABLET | Refills: 1 | Status: SHIPPED | OUTPATIENT
Start: 2022-10-18 | End: 2023-03-15 | Stop reason: SDUPTHER

## 2022-11-11 ENCOUNTER — TELEPHONE (OUTPATIENT)
Dept: FAMILY MEDICINE CLINIC | Facility: CLINIC | Age: 57
End: 2022-11-11

## 2022-11-11 NOTE — TELEPHONE ENCOUNTER
Caller: Rivera Ku    Relationship to patient: Self    Best call back number: [phone]    Chief complaint: FOLLOW UP     Type of visit: OFFICE VISIT     Requested date: ASAP         Additional notes: PATIENT REQUESTING A APPOINTMENT SOONER THAN THE NEXT AVAILABLE, 12/07/2022

## 2022-11-14 ENCOUNTER — TELEPHONE (OUTPATIENT)
Dept: FAMILY MEDICINE CLINIC | Facility: CLINIC | Age: 57
End: 2022-11-14

## 2022-11-28 ENCOUNTER — OFFICE VISIT (OUTPATIENT)
Dept: FAMILY MEDICINE CLINIC | Facility: CLINIC | Age: 57
End: 2022-11-28

## 2022-11-28 VITALS
BODY MASS INDEX: 25.91 KG/M2 | SYSTOLIC BLOOD PRESSURE: 104 MMHG | HEIGHT: 60 IN | WEIGHT: 132 LBS | HEART RATE: 84 BPM | DIASTOLIC BLOOD PRESSURE: 57 MMHG | OXYGEN SATURATION: 96 %

## 2022-11-28 DIAGNOSIS — Z71.2 ENCOUNTER TO DISCUSS TEST RESULTS: Primary | ICD-10-CM

## 2022-11-28 DIAGNOSIS — R00.2 PALPITATIONS: ICD-10-CM

## 2022-11-28 DIAGNOSIS — H53.9 VISION CHANGES: ICD-10-CM

## 2022-11-28 PROBLEM — E78.2 MIXED HYPERLIPIDEMIA: Status: ACTIVE | Noted: 2022-11-28

## 2022-11-28 PROCEDURE — 99214 OFFICE O/P EST MOD 30 MIN: CPT | Performed by: NURSE PRACTITIONER

## 2022-11-28 NOTE — PROGRESS NOTES
"Chief Complaint  Review Brain MRI    Subjective            Rivera Ku presents to [facility]  History of Present Illness  Pt is here to discuss MRI results. White matter was found on the imaging. Pt has a neuro consult on 1/31/23. Pt reports his \"spells\" are happening more frequently and he would like to get into neuro sooner if possible he will call and be put on the waiting list.  He reports he sometimes gets palpitations and has some vision changes but every \"spell\" is different. He cannot pinpoint a pattern. He has had these \"spells\" for years but lately they are more frequent.  He is open to a cardiology consult as well due to the palpitations, he denies any chest pain.        Past Medical History:   Diagnosis Date   • Hyperlipidemia        No Known Allergies     History reviewed. No pertinent surgical history.     Social History     Tobacco Use   • Smoking status: Never   • Smokeless tobacco: Former     Types: Chew   • Tobacco comments:     FOR 10 YEARS   Substance Use Topics   • Alcohol use: Never       Family History   Problem Relation Age of Onset   • Lung cancer Father         Current Outpatient Medications on File Prior to Visit   Medication Sig   • nystatin (MYCOSTATIN) 100,000 unit/mL suspension SWISH AND SPIT 5 ML''S FOR 2 MINUTES 4-5 TIMES DAILY AS DIRECTED   • rosuvastatin (CRESTOR) 10 MG tablet TAKE 1 TABLET BY MOUTH EVERY DAY     No current facility-administered medications on file prior to visit.       Health Maintenance Due   Topic Date Due   • COLORECTAL CANCER SCREENING  Never done       Objective     /57   Pulse 84   Ht 152.4 cm (60\")   Wt 59.9 kg (132 lb)   SpO2 96%   BMI 25.78 kg/m²       Physical Exam  Constitutional:       General: He is not in acute distress.     Appearance: Normal appearance. He is not ill-appearing.   HENT:      Head: Normocephalic and atraumatic.   Cardiovascular:      Rate and Rhythm: Normal rate and regular rhythm.     "  Heart sounds: Normal heart sounds. No murmur heard.  Pulmonary:      Effort: Pulmonary effort is normal. No respiratory distress.      Breath sounds: Normal breath sounds.   Chest:      Chest wall: No tenderness.   Abdominal:      General: There is no distension.      Palpations: There is no mass.      Tenderness: There is no abdominal tenderness. There is no guarding.   Musculoskeletal:         General: No swelling or tenderness. Normal range of motion.      Cervical back: Normal range of motion and neck supple.   Skin:     General: Skin is warm and dry.      Findings: No rash.   Neurological:      General: No focal deficit present.      Mental Status: He is alert and oriented to person, place, and time. Mental status is at baseline.      Motor: Motor function is intact.      Coordination: Coordination is intact.      Gait: Gait is intact. Gait normal.   Psychiatric:         Mood and Affect: Mood normal.         Behavior: Behavior normal.         Thought Content: Thought content normal.         Judgment: Judgment normal.           Result Review :                           Assessment and Plan        Diagnoses and all orders for this visit:    1. Encounter to discuss test results (Primary)  Comments:  advised to keep consult appt with Neuro     2. Vision changes  Comments:  advised to keep consult appt with Neuro     3. Palpitations  -     Ambulatory Referral to Cardiology          I spent 30 minutes caring for Rivera on this date of service. This time includes time spent by me in the following activities:preparing for the visit, reviewing tests, obtaining and/or reviewing a separately obtained history, performing a medically appropriate examination and/or evaluation , counseling and educating the patient/family/caregiver, referring and communicating with other health care professionals , documenting information in the medical record and independently interpreting results and communicating that information with the  patient/family/caregiver    Follow Up     Return if symptoms worsen or fail to improve.    Patient was given instructions and counseling regarding his condition or for health maintenance advice. Please see specific information pulled into the AVS if appropriate.     Rivera SEAY Kings  reports that he has never smoked. He has quit using smokeless tobacco.  His smokeless tobacco use included chew..

## 2022-11-29 ENCOUNTER — TELEPHONE (OUTPATIENT)
Dept: NEUROLOGY | Facility: CLINIC | Age: 57
End: 2022-11-29

## 2022-11-29 ENCOUNTER — TELEPHONE (OUTPATIENT)
Dept: FAMILY MEDICINE CLINIC | Facility: CLINIC | Age: 57
End: 2022-11-29

## 2022-11-29 NOTE — TELEPHONE ENCOUNTER
Provider: LINDA DANGELO  Caller: REFERRING PROVIDER  Relationship to Patient: NA  Phone Number: [phone]  Reason for Call: [place] WITH REFERRING PROVIDER CALLING TO SEE IF PATIENT CAN GET AN APPT ANY SOONER. PLEASE REVIEW SCHEDULE AND ADVISE. THANK YOU.

## 2022-12-07 ENCOUNTER — OFFICE VISIT (OUTPATIENT)
Dept: CARDIOLOGY | Facility: CLINIC | Age: 57
End: 2022-12-07

## 2022-12-07 VITALS
DIASTOLIC BLOOD PRESSURE: 72 MMHG | HEART RATE: 73 BPM | HEIGHT: 60 IN | SYSTOLIC BLOOD PRESSURE: 136 MMHG | WEIGHT: 131 LBS | BODY MASS INDEX: 25.72 KG/M2

## 2022-12-07 DIAGNOSIS — E78.2 MIXED HYPERLIPIDEMIA: ICD-10-CM

## 2022-12-07 DIAGNOSIS — R00.2 PALPITATIONS: Primary | ICD-10-CM

## 2022-12-07 PROCEDURE — 93000 ELECTROCARDIOGRAM COMPLETE: CPT | Performed by: INTERNAL MEDICINE

## 2022-12-07 PROCEDURE — 99203 OFFICE O/P NEW LOW 30 MIN: CPT | Performed by: INTERNAL MEDICINE

## 2022-12-07 RX ORDER — ASPIRIN 81 MG/1
81 TABLET ORAL DAILY
Qty: 30 TABLET | Refills: 5 | COMMUNITY
Start: 2022-12-07

## 2022-12-07 NOTE — PROGRESS NOTES
CARDIOLOGY INITIAL CONSULT       Chief Complaint  Palpitations    Subjective            Rivera Ku presents to [facility]  History of Present Illness    Mr Ku is a 57-year-old male with hyperlipidemia, who was referred for cardiac evaluation because of palpitations and spells involving blurry vision.  Per patient, he is getting these spells for the past several years.  A typical spell is described as blurry vision, followed by visual hallucinations, nausea and lightheadedness.  The episodes generally last for less than 5 minutes.  Most of the time he also gets palpitations described as heart fluttering, with it.  Previously he used to get these events once a year but more recently its once or twice every week.  He denies having any chest pain but has a burning sensation of the left upper chest.  Denies palpitations.  Denies any syncopal episodes.  He had an MRI of the brain done recently which showed chronic small vessel changes.  He was referred to neurology and is still waiting for the appointment.      Past History:    Medical History:  Past Medical History:   Diagnosis Date   • Hyperlipidemia        Surgical History: has no past surgical history on file.     Family History: family history includes Lung cancer in his father.  Family history is positive coronary disease.  Patient's father had coronary bypass grafting done twice.    Social History: reports that he has never smoked. He has quit using smokeless tobacco.  His smokeless tobacco use included chew. He reports that he does not drink alcohol and does not use drugs.    Allergies: Patient has no known allergies.    Current Outpatient Medications on File Prior to Visit   Medication Sig   • nystatin (MYCOSTATIN) 100,000 unit/mL suspension SWISH AND SPIT 5 ML''S FOR 2 MINUTES 4-5 TIMES DAILY AS DIRECTED   • rosuvastatin (CRESTOR) 10 MG tablet TAKE 1 TABLET BY MOUTH EVERY DAY     No current facility-administered medications  "on file prior to visit.          Review of Systems   Constitutional: Negative for fatigue, unexpected weight gain and unexpected weight loss.   Eyes: Positive for blurred vision and visual disturbance. Negative for double vision.   Respiratory: Negative for cough, shortness of breath and wheezing.    Cardiovascular: Positive for palpitations. Negative for chest pain and leg swelling.   Gastrointestinal: Negative for abdominal pain, nausea and vomiting.   Endocrine: Negative for cold intolerance, heat intolerance, polydipsia and polyuria.   Musculoskeletal: Negative for arthralgias and back pain.   Skin: Negative for color change.   Neurological: Positive for dizziness. Negative for syncope, weakness and headache.   Hematological: Does not bruise/bleed easily.        Objective     /72   Pulse 73   Ht 152.4 cm (60\")   Wt 59.4 kg (131 lb)   BMI 25.58 kg/m²       Physical Exam  Constitutional:       General: He is awake. He is not in acute distress.     Appearance: Normal appearance.   Eyes:      Extraocular Movements: Extraocular movements intact.      Pupils: Pupils are equal, round, and reactive to light.   Neck:      Thyroid: No thyromegaly.      Vascular: No carotid bruit or JVD.   Cardiovascular:      Rate and Rhythm: Normal rate and regular rhythm.      Chest Wall: PMI is not displaced.      Heart sounds: Normal heart sounds, S1 normal and S2 normal. No murmur heard.    No friction rub. No gallop. No S3 or S4 sounds.   Pulmonary:      Effort: Pulmonary effort is normal. No respiratory distress.      Breath sounds: Normal breath sounds. No wheezing, rhonchi or rales.   Abdominal:      General: Bowel sounds are normal.      Palpations: Abdomen is soft.      Tenderness: There is no abdominal tenderness.   Musculoskeletal:      Cervical back: Neck supple.      Right lower leg: No edema.      Left lower leg: No edema.   Skin:     Nails: There is no clubbing.   Neurological:      General: No focal deficit " present.      Mental Status: He is alert and oriented to person, place, and time.           Result Review :     The following data was reviewed by: Ever Todd MD on 12/07/2022:    CMP    CMP 4/13/22 9/8/22   Glucose 90 80   BUN 15 19   Creatinine 1.11 1.19   Sodium 140 142   Potassium 3.9 4.3   Chloride 103 106   Calcium 9.2 9.2   Albumin 4.20 4.40   Total Bilirubin 0.6 0.6   Alkaline Phosphatase 81 86   AST (SGOT) 16 25   ALT (SGPT) 21 24           CBC    CBC 4/13/22   WBC 5.52   RBC 5.39   Hemoglobin 16.0   Hematocrit 48.6   MCV 90.2   MCH 29.7   MCHC 32.9   RDW 12.8   Platelets 236           TSH    TSH 4/13/22   TSH 2.990           Lipid Panel    Lipid Panel 4/13/22 9/8/22   Total Cholesterol 235 (A) 169   Triglycerides 68 72   HDL Cholesterol 56 60   VLDL Cholesterol 12 14   LDL Cholesterol  167 (A) 95   LDL/HDL Ratio 2.95 1.58   (A) Abnormal value               Data reviewed: Cardiology studies              ECG 12 Lead    Date/Time: 12/7/2022 11:29 AM  Performed by: Ever Todd MD  Authorized by: Ever Todd MD   Comparison: not compared with previous ECG   Previous ECG: no previous ECG available  Rhythm: sinus rhythm  Rate: normal  Conduction: conduction normal  ST Segments: ST segments normal  T Waves: T waves normal  QRS axis: normal    Clinical impression: normal ECG                Assessment and Plan        Diagnoses and all orders for this visit:    1. Palpitations (Primary)  Assessment & Plan:  Primary symptom is episodic blurred vision, visual hallucinations, dizziness, usually associated with palpitations.  From cardiac standpoint, differential diagnoses include significant arrhythmia or valvular heart disease.  EKG is unremarkable.  We will proceed with a 2-week extended Holter monitor study.  Patient will also be scheduled for an echocardiogram to assess the LV systolic and diastolic function and to rule out any significant valvular abnormalities.  Since the MRI of the brain also  shows chronic small vessel disease, recommend to start taking aspirin 81 mg p.o. daily.  Continue rosuvastatin.    Orders:  -     Adult Transthoracic Echo Complete W/ Cont if Necessary Per Protocol; Future  -     Holter Monitor - 72 Hour Up To 15 Days; Future  -     aspirin 81 MG EC tablet; Take 1 tablet by mouth Daily.  Dispense: 30 tablet; Refill: 5  -     ECG 12 Lead    2. Mixed hyperlipidemia  Assessment & Plan:  Recent LDL is 95, significantly improved from before.  Continue rosuvastatin 10 mg nightly.        I spent 21 minutes caring for Rivera on this date of service. This time includes time spent by me in the following activities:reviewing tests, obtaining and/or reviewing a separately obtained history, performing a medically appropriate examination and/or evaluation , ordering medications, tests, or procedures, and documenting information in the medical record    Follow Up     Return for Will schedule f/u after reviewing test results.    Patient was given instructions and counseling regarding his condition or for health maintenance advice. Please see specific information pulled into the AVS if appropriate.

## 2022-12-07 NOTE — ASSESSMENT & PLAN NOTE
Primary symptom is episodic blurred vision, visual hallucinations, dizziness, usually associated with palpitations.  From cardiac standpoint, differential diagnoses include significant arrhythmia or valvular heart disease.  EKG is unremarkable.  We will proceed with a 2-week extended Holter monitor study.  Patient will also be scheduled for an echocardiogram to assess the LV systolic and diastolic function and to rule out any significant valvular abnormalities.  Since the MRI of the brain also shows chronic small vessel disease, recommend to start taking aspirin 81 mg p.o. daily.  Continue rosuvastatin.

## 2022-12-30 ENCOUNTER — TELEPHONE (OUTPATIENT)
Dept: FAMILY MEDICINE CLINIC | Facility: CLINIC | Age: 57
End: 2022-12-30

## 2022-12-30 NOTE — TELEPHONE ENCOUNTER
Phoned patient to remind him of cologuard, he states he has the kit and will do his best to get this completed.

## 2023-01-06 ENCOUNTER — TELEPHONE (OUTPATIENT)
Dept: CARDIOLOGY | Facility: CLINIC | Age: 58
End: 2023-01-06
Payer: COMMERCIAL

## 2023-01-06 NOTE — TELEPHONE ENCOUNTER
Patient called requesting call back with results on ECHO.     SW patient regarding ECHO results. Patient states frustration with finding out with what is going on with his body.     Patient states the first day he wore the holter monitor he felt bad all day he didn't note that he was having symptoms.     Patient verbalized understanding and appreciation for the call.  Patient looking forward to the Holter Monitor results.

## 2023-01-10 ENCOUNTER — LAB (OUTPATIENT)
Dept: LAB | Facility: HOSPITAL | Age: 58
End: 2023-01-10
Payer: COMMERCIAL

## 2023-01-10 DIAGNOSIS — R97.20 ELEVATED PROSTATE SPECIFIC ANTIGEN (PSA): ICD-10-CM

## 2023-01-10 LAB — PSA SERPL-MCNC: 3.76 NG/ML (ref 0–4)

## 2023-01-10 PROCEDURE — 36415 COLL VENOUS BLD VENIPUNCTURE: CPT

## 2023-01-10 PROCEDURE — 84153 ASSAY OF PSA TOTAL: CPT

## 2023-01-13 ENCOUNTER — TELEPHONE (OUTPATIENT)
Dept: CARDIOLOGY | Facility: CLINIC | Age: 58
End: 2023-01-13
Payer: COMMERCIAL

## 2023-01-13 ENCOUNTER — TELEPHONE (OUTPATIENT)
Dept: CARDIOLOGY | Facility: CLINIC | Age: 58
End: 2023-01-13

## 2023-01-13 NOTE — TELEPHONE ENCOUNTER
----- Message from Ever Todd MD sent at 1/13/2023  2:54 PM EST -----  The event monitor study showed no major findings.  There were some extra/ectopic heartbeats (PACs) noted, including short runs of PACs.  He reported 20 symptomatic episodes during the study and only 2 of them correlated with presence of PACs on monitor strips.    Previous echocardiogram showed normal heart function with no significant valvular abnormalities.    No cardiac etiologies identified to explain patient's symptoms.  We will hold off on doing further cardiac testing at this time.  We recommended routine follow-up visit in 6 months for reevaluation, with ROSE MARY Epstein.  Please call us back for any worsening or recurrent symptoms in the meantime.      Electronically signed by Ever Todd MD, 01/13/23, 2:54 PM EST.

## 2023-01-13 NOTE — TELEPHONE ENCOUNTER
MYNOR patient regarding results. Patient scheduled 6 month f/u. Patient encouraged to talk with PCP for further testing.

## 2023-01-16 ENCOUNTER — OFFICE VISIT (OUTPATIENT)
Dept: UROLOGY | Facility: CLINIC | Age: 58
End: 2023-01-16
Payer: COMMERCIAL

## 2023-01-16 VITALS — HEART RATE: 72 BPM | WEIGHT: 133 LBS | HEIGHT: 60 IN | BODY MASS INDEX: 26.11 KG/M2

## 2023-01-16 DIAGNOSIS — N40.1 BENIGN PROSTATIC HYPERPLASIA WITH NOCTURIA: Primary | ICD-10-CM

## 2023-01-16 DIAGNOSIS — R35.1 BENIGN PROSTATIC HYPERPLASIA WITH NOCTURIA: Primary | ICD-10-CM

## 2023-01-16 PROCEDURE — 99213 OFFICE O/P EST LOW 20 MIN: CPT | Performed by: NURSE PRACTITIONER

## 2023-01-16 NOTE — PROGRESS NOTES
Chief Complaint: Elevated PSA    Subjective      Elevated psa       History of Present Illness  Rivera Ku is a 57 y.o. male presents to [facility] for elevated psa.    Patient presents today on referral from Kayce Simmons for an elevated PSA.    Patient denies any urinary frequency, urgency or dysuria.    Denies any gross hematuria.    Admits to nocturia 1X/night.    Denies slow urinary stream or spitting.    Denies any penis or scrotum pain.  Denies any ED issues.    Denies family history of prostate cancer.    Denies taking any blood thinners.    Previous psa's:  4/22: 4.29      *Update* 7/6/22:    Patient presents today for follow-up visit after repeating his PSA.    PSA is down 3.58 from 4.29.    Patient denies any urinary frequency, urgency or dysuria.    Denies any gross hematuria.    Admits to occasional nocturia.    *Update* 1/16/23:    Patient presents today for follow-up visit after repeating his PSA.    Patient denies any urinary frequency, urgency or dysuria.    Admits to occasional nocturia.    Denies any gross hematuria.    Previous psa's:   1/23: 3.76  6/22: 3.58  4/22: 4.29    Objective     Past Medical History:   Diagnosis Date   • Hyperlipidemia        History reviewed. No pertinent surgical history.    Outpatient Medications Marked as Taking for the 1/16/23 encounter (Office Visit) with Sarah Chandler APRN   Medication Sig Dispense Refill   • aspirin 81 MG EC tablet Take 1 tablet by mouth Daily. 30 tablet 5   • rosuvastatin (CRESTOR) 10 MG tablet TAKE 1 TABLET BY MOUTH EVERY DAY 90 tablet 1       No Known Allergies     Family History   Problem Relation Age of Onset   • Lung cancer Father        Social History     Socioeconomic History   • Marital status:    Tobacco Use   • Smoking status: Never   • Smokeless tobacco: Former     Types: Chew   • Tobacco comments:     FOR 10 YEARS   Vaping Use   • Vaping Use: Never used   Substance and Sexual Activity   •  "Alcohol use: Never   • Drug use: Never   • Sexual activity: Defer       Review of Systems   Constitutional: Negative for chills and fever.   Genitourinary: Positive for nocturia. Negative for decreased urine volume, difficulty urinating, discharge, dysuria, flank pain, frequency, genital sores, hematuria, penile pain, erectile dysfunction, penile swelling, scrotal swelling, testicular pain, urgency and urinary incontinence.        Vital Signs:   Pulse 72   Ht 152.4 cm (60\")   Wt 60.3 kg (133 lb)   BMI 25.97 kg/m²      Physical Exam  Constitutional:       General: He is not in acute distress.     Appearance: Normal appearance.   HENT:      Head: Normocephalic.   Cardiovascular:      Rate and Rhythm: Normal rate.   Pulmonary:      Effort: Pulmonary effort is normal.      Breath sounds: No stridor.   Abdominal:      General: Abdomen is flat.      Palpations: Abdomen is soft.      Tenderness: There is no abdominal tenderness.   Genitourinary:     Comments:     Musculoskeletal:         General: No deformity. Normal range of motion.   Skin:     General: Skin is warm and dry.      Coloration: Skin is not jaundiced.   Neurological:      General: No focal deficit present.      Mental Status: He is alert and oriented to person, place, and time.   Psychiatric:         Mood and Affect: Mood normal.         Thought Content: Thought content normal.          Result Review :   [x]  Laboratory  []  Radiology  []  Pathology  []  Microbiology  []  EKG/Telemetry   []  Cardiology/Vascular   [x]  Old records  Today I reviewed Kayce Simmons's previous office note along with previous PSAs.     Assessment and Plan    Diagnoses and all orders for this visit:    1. Benign prostatic hyperplasia with nocturia (Primary)  -     PSA Diagnostic; Future        Follow Up   Return for 1 year annual exam; PSA prior to the visit.     BPH with Luts- behavioral modifications including fluid management, limiting fluids prior to sleep, and voiding " immediately prior to sleep.     Patient was given instructions and counseling regarding his condition or for health maintenance advice. Please see specific information pulled into the AVS if appropriate.

## 2023-01-31 ENCOUNTER — OFFICE VISIT (OUTPATIENT)
Dept: NEUROLOGY | Facility: CLINIC | Age: 58
End: 2023-01-31
Payer: COMMERCIAL

## 2023-01-31 ENCOUNTER — LAB (OUTPATIENT)
Dept: LAB | Facility: HOSPITAL | Age: 58
End: 2023-01-31
Payer: COMMERCIAL

## 2023-01-31 VITALS
SYSTOLIC BLOOD PRESSURE: 116 MMHG | DIASTOLIC BLOOD PRESSURE: 70 MMHG | WEIGHT: 134.6 LBS | BODY MASS INDEX: 26.42 KG/M2 | HEART RATE: 81 BPM | HEIGHT: 60 IN

## 2023-01-31 DIAGNOSIS — R20.2 PARESTHESIA: Primary | ICD-10-CM

## 2023-01-31 DIAGNOSIS — R25.3 MUSCLE TWITCHING: ICD-10-CM

## 2023-01-31 DIAGNOSIS — H53.9 VISUAL DISTURBANCES: ICD-10-CM

## 2023-01-31 DIAGNOSIS — G43.109 OCULAR MIGRAINE: ICD-10-CM

## 2023-01-31 DIAGNOSIS — R35.1 BENIGN PROSTATIC HYPERPLASIA WITH NOCTURIA: ICD-10-CM

## 2023-01-31 DIAGNOSIS — R20.2 PARESTHESIA: ICD-10-CM

## 2023-01-31 DIAGNOSIS — R53.1 SPELL OF GENERALIZED WEAKNESS: ICD-10-CM

## 2023-01-31 DIAGNOSIS — N40.1 BENIGN PROSTATIC HYPERPLASIA WITH NOCTURIA: ICD-10-CM

## 2023-01-31 LAB
ALBUMIN SERPL-MCNC: 4.5 G/DL (ref 3.5–5.2)
ALBUMIN/GLOB SERPL: 1.7 G/DL
ALP SERPL-CCNC: 85 U/L (ref 39–117)
ALT SERPL W P-5'-P-CCNC: 30 U/L (ref 1–41)
ANION GAP SERPL CALCULATED.3IONS-SCNC: 7.1 MMOL/L (ref 5–15)
AST SERPL-CCNC: 25 U/L (ref 1–40)
BILIRUB SERPL-MCNC: 0.6 MG/DL (ref 0–1.2)
BUN SERPL-MCNC: 14 MG/DL (ref 6–20)
BUN/CREAT SERPL: 13.1 (ref 7–25)
CALCIUM SPEC-SCNC: 9.3 MG/DL (ref 8.6–10.5)
CHLORIDE SERPL-SCNC: 105 MMOL/L (ref 98–107)
CO2 SERPL-SCNC: 28.9 MMOL/L (ref 22–29)
CREAT SERPL-MCNC: 1.07 MG/DL (ref 0.76–1.27)
CRP SERPL-MCNC: <0.3 MG/DL (ref 0–0.5)
DEPRECATED RDW RBC AUTO: 39.5 FL (ref 37–54)
EGFRCR SERPLBLD CKD-EPI 2021: 79.9 ML/MIN/1.73
ERYTHROCYTE [DISTWIDTH] IN BLOOD BY AUTOMATED COUNT: 12.5 % (ref 12.3–15.4)
ERYTHROCYTE [SEDIMENTATION RATE] IN BLOOD: 4 MM/HR (ref 0–20)
FOLATE SERPL-MCNC: 15.7 NG/ML (ref 4.78–24.2)
GLOBULIN UR ELPH-MCNC: 2.7 GM/DL
GLUCOSE SERPL-MCNC: 82 MG/DL (ref 65–99)
HCT VFR BLD AUTO: 46.2 % (ref 37.5–51)
HGB BLD-MCNC: 15.8 G/DL (ref 13–17.7)
MAGNESIUM SERPL-MCNC: 2.3 MG/DL (ref 1.6–2.6)
MCH RBC QN AUTO: 29.9 PG (ref 26.6–33)
MCHC RBC AUTO-ENTMCNC: 34.2 G/DL (ref 31.5–35.7)
MCV RBC AUTO: 87.3 FL (ref 79–97)
PLATELET # BLD AUTO: 204 10*3/MM3 (ref 140–450)
PMV BLD AUTO: 11.4 FL (ref 6–12)
POTASSIUM SERPL-SCNC: 4.3 MMOL/L (ref 3.5–5.2)
PROT SERPL-MCNC: 7.2 G/DL (ref 6–8.5)
PSA SERPL-MCNC: 4.49 NG/ML (ref 0–4)
RBC # BLD AUTO: 5.29 10*6/MM3 (ref 4.14–5.8)
SODIUM SERPL-SCNC: 141 MMOL/L (ref 136–145)
T4 FREE SERPL-MCNC: 1.12 NG/DL (ref 0.93–1.7)
TSH SERPL DL<=0.05 MIU/L-ACNC: 1.93 UIU/ML (ref 0.27–4.2)
VIT B12 BLD-MCNC: 823 PG/ML (ref 211–946)
WBC NRBC COR # BLD: 5.57 10*3/MM3 (ref 3.4–10.8)

## 2023-01-31 PROCEDURE — 83735 ASSAY OF MAGNESIUM: CPT

## 2023-01-31 PROCEDURE — 80050 GENERAL HEALTH PANEL: CPT

## 2023-01-31 PROCEDURE — 86235 NUCLEAR ANTIGEN ANTIBODY: CPT

## 2023-01-31 PROCEDURE — 36415 COLL VENOUS BLD VENIPUNCTURE: CPT

## 2023-01-31 PROCEDURE — 82607 VITAMIN B-12: CPT

## 2023-01-31 PROCEDURE — 84153 ASSAY OF PSA TOTAL: CPT

## 2023-01-31 PROCEDURE — 83921 ORGANIC ACID SINGLE QUANT: CPT

## 2023-01-31 PROCEDURE — 85652 RBC SED RATE AUTOMATED: CPT

## 2023-01-31 PROCEDURE — 84439 ASSAY OF FREE THYROXINE: CPT

## 2023-01-31 PROCEDURE — 82746 ASSAY OF FOLIC ACID SERUM: CPT

## 2023-01-31 PROCEDURE — 99215 OFFICE O/P EST HI 40 MIN: CPT | Performed by: NURSE PRACTITIONER

## 2023-01-31 PROCEDURE — 86140 C-REACTIVE PROTEIN: CPT

## 2023-01-31 NOTE — ASSESSMENT & PLAN NOTE
History of ocular migraine.  Discussed that progression of spells could be attributed to complex migraine.  Can consider prophylactic treatment in the future if EEG/labs are unrevealing.

## 2023-01-31 NOTE — ASSESSMENT & PLAN NOTE
He is very concerned about seizure.  Is experiencing spells of visual deficits and weakness.  Will order EEG for evaluation.

## 2023-01-31 NOTE — PROGRESS NOTES
"Chief Complaint  No chief complaint on file.    Subjective          Rivera Ku presents to [facility]  History of Present Illness  States over 20 years ago he had an episode of decreased vision.  About once a year he would have visual disturbance that lasted about 5 minutes, sometimes followed by a headache.  Since March 2022, he states the vision issues became more of an issue.  Since October has had visual issues about twice. However, states he feels generally ill. Has had twiching under left eye.  Feels paresthesia to scalp and sides of face.  Occasionally mildly lightheaded. Occasional nausea.  Some fatigue.  Sometimes has a burning sensation in left shoulder.  A couple of times his tongue has felt like sandpaper, dry mouth.        Objective   Vital Signs:   /70   Pulse 81   Ht 152.4 cm (60\")   Wt 61.1 kg (134 lb 9.6 oz)   BMI 26.29 kg/m²     Physical Exam  Neurological:      Mental Status: He is oriented to person, place, and time.      Cranial Nerves: Cranial nerves 2-12 are intact.      Motor: Motor strength is normal.      Gait: Gait is intact.      Deep Tendon Reflexes:      Reflex Scores:       Brachioradialis reflexes are 2+ on the right side and 2+ on the left side.       Patellar reflexes are 2+ on the right side and 2+ on the left side.       Neurologic Exam     Mental Status   Oriented to person, place, and time.     Cranial Nerves   Cranial nerves II through XII intact.     Motor Exam   Muscle bulk: normal    Strength   Strength 5/5 throughout.     Sensory Exam   Light touch normal.     Gait, Coordination, and Reflexes     Gait  Gait: normal    Reflexes   Right brachioradialis: 2+  Left brachioradialis: 2+  Right patellar: 2+  Left patellar: 2+       Result Review :             MoCA: 28/30    MRI Brain: Age appropriate cerebrovascular disease     Assessment and Plan    Diagnoses and all orders for this visit:    1. Paresthesia " (Primary)  Assessment & Plan:  Will order labs for further evaluation of intermittent scalp and facial paresthesia and fatigue.     Orders:  -     CBC (No Diff); Future  -     Comprehensive Metabolic Panel; Future  -     Vitamin B12 & Folate; Future  -     Sedimentation Rate; Future  -     C-reactive Protein; Future  -     Sjogren's Antibody, Anti-SS-A / -SS-B; Future  -     TSH; Future  -     T4, free; Future  -     Methylmalonic Acid, Serum; Future  -     Magnesium; Future  -     EEG (Hospital Performed); Future    2. Muscle twitching  -     CBC (No Diff); Future  -     Comprehensive Metabolic Panel; Future  -     Vitamin B12 & Folate; Future  -     Sedimentation Rate; Future  -     C-reactive Protein; Future  -     Sjogren's Antibody, Anti-SS-A / -SS-B; Future  -     TSH; Future  -     T4, free; Future  -     Methylmalonic Acid, Serum; Future  -     Magnesium; Future  -     EEG (Hospital Performed); Future    3. Visual disturbances  -     CBC (No Diff); Future  -     Comprehensive Metabolic Panel; Future  -     Vitamin B12 & Folate; Future  -     Sedimentation Rate; Future  -     C-reactive Protein; Future  -     Sjogren's Antibody, Anti-SS-A / -SS-B; Future  -     TSH; Future  -     T4, free; Future  -     Methylmalonic Acid, Serum; Future  -     Magnesium; Future  -     EEG (Hospital Performed); Future    4. Spell of generalized weakness  Assessment & Plan:  He is very concerned about seizure.  Is experiencing spells of visual deficits and weakness.  Will order EEG for evaluation.     Orders:  -     EEG (Hospital Performed); Future    5. Ocular migraine  Assessment & Plan:  History of ocular migraine.  Discussed that progression of spells could be attributed to complex migraine.  Can consider prophylactic treatment in the future if EEG/labs are unrevealing.        I spent 45 minutes caring for Rivera on this date of service. This time includes time spent by me in the following activities:preparing for the visit,  reviewing tests, obtaining and/or reviewing a separately obtained history, performing a medically appropriate examination and/or evaluation , counseling and educating the patient/family/caregiver, ordering medications, tests, or procedures, documenting information in the medical record and independently interpreting results and communicating that information with the patient/family/caregiver  Follow Up   Return in about 2 months (around 3/31/2023).  Patient was given instructions and counseling regarding his condition or for health maintenance advice. Please see specific information pulled into the AVS if appropriate.

## 2023-02-01 LAB
ENA SS-A AB SER-ACNC: <0.2 AI (ref 0–0.9)
ENA SS-B AB SER-ACNC: <0.2 AI (ref 0–0.9)

## 2023-02-03 ENCOUNTER — TELEPHONE (OUTPATIENT)
Dept: NEUROLOGY | Facility: CLINIC | Age: 58
End: 2023-02-03
Payer: COMMERCIAL

## 2023-02-03 NOTE — TELEPHONE ENCOUNTER
Caller: ARTHUR     Jean call back number: [phone] CAN LEAVE A DETAILED MESS IF GET V.M .     LABS  01.31.23  AT [facility], [place], operated by [facility]     What was the call regarding: PT CALLING WANTS  TO KNOW IF YOU CAN CALL WITH  RESULTS OF LABS.     Do you require a callback: YES     PLEASE ADVISE

## 2023-02-05 LAB — METHYLMALONATE SERPL-SCNC: 163 NMOL/L (ref 0–378)

## 2023-02-07 NOTE — TELEPHONE ENCOUNTER
Patient would like to know how to proceed since testing came back normal & would also like to know if EEG on 2/17 is still required.

## 2023-02-17 ENCOUNTER — HOSPITAL ENCOUNTER (OUTPATIENT)
Dept: NEUROLOGY | Facility: HOSPITAL | Age: 58
Discharge: HOME | End: 2023-02-17
Admitting: NURSE PRACTITIONER
Payer: COMMERCIAL

## 2023-02-17 DIAGNOSIS — R20.2 PARESTHESIA: ICD-10-CM

## 2023-02-17 DIAGNOSIS — R25.3 MUSCLE TWITCHING: ICD-10-CM

## 2023-02-17 DIAGNOSIS — H53.9 VISUAL DISTURBANCES: ICD-10-CM

## 2023-02-17 DIAGNOSIS — R53.1 SPELL OF GENERALIZED WEAKNESS: ICD-10-CM

## 2023-02-17 PROCEDURE — 95819 EEG AWAKE AND ASLEEP: CPT | Performed by: PSYCHIATRY & NEUROLOGY

## 2023-02-17 PROCEDURE — 95819 EEG AWAKE AND ASLEEP: CPT

## 2023-03-15 ENCOUNTER — OFFICE VISIT (OUTPATIENT)
Dept: FAMILY MEDICINE CLINIC | Facility: CLINIC | Age: 58
End: 2023-03-15
Payer: COMMERCIAL

## 2023-03-15 VITALS
SYSTOLIC BLOOD PRESSURE: 121 MMHG | WEIGHT: 137 LBS | HEIGHT: 60 IN | BODY MASS INDEX: 26.9 KG/M2 | DIASTOLIC BLOOD PRESSURE: 67 MMHG | HEART RATE: 80 BPM | OXYGEN SATURATION: 98 %

## 2023-03-15 DIAGNOSIS — Z00.00 ANNUAL PHYSICAL EXAM: Primary | ICD-10-CM

## 2023-03-15 DIAGNOSIS — G43.109 OCULAR MIGRAINE: ICD-10-CM

## 2023-03-15 DIAGNOSIS — E78.2 MIXED HYPERLIPIDEMIA: ICD-10-CM

## 2023-03-15 DIAGNOSIS — K21.9 GASTROESOPHAGEAL REFLUX DISEASE WITHOUT ESOPHAGITIS: ICD-10-CM

## 2023-03-15 DIAGNOSIS — Z12.11 SCREENING FOR COLON CANCER: ICD-10-CM

## 2023-03-15 PROCEDURE — 99396 PREV VISIT EST AGE 40-64: CPT | Performed by: NURSE PRACTITIONER

## 2023-03-15 RX ORDER — PANTOPRAZOLE SODIUM 40 MG/1
40 TABLET, DELAYED RELEASE ORAL DAILY
Qty: 90 TABLET | Refills: 0 | Status: SHIPPED | OUTPATIENT
Start: 2023-03-15

## 2023-03-15 RX ORDER — ROSUVASTATIN CALCIUM 10 MG/1
10 TABLET, COATED ORAL DAILY
Qty: 90 TABLET | Refills: 1 | Status: SHIPPED | OUTPATIENT
Start: 2023-03-15

## 2023-03-15 NOTE — PROGRESS NOTES
"Chief Complaint  Annual Exam and Hyperlipidemia, GERD    Subjective            Rivera Ku presents to [facility]  History of Present Illness  Pt is an annual CPE for HLD.    Pt is due colon screening.  Will order colonoscopy.     Pt is followed by Dr. Todd for cardiology.    Pt is followed by neurology for vision changes.  He had a normal EEG and was told possible ocular migrainus will f/u with neurology for treatment.    PT complains of possible GERD with stomach pains occasionally, will do a trial of Protonix.    Pt declines vaccines. Pt understands the risks of not having.             Past Medical History:   Diagnosis Date   • Hyperlipidemia    • Ocular migraine 1/31/2023       No Known Allergies     History reviewed. No pertinent surgical history.     Social History     Tobacco Use   • Smoking status: Former     Packs/day: 0.25     Years: 2.00     Pack years: 0.50     Types: Cigarettes   • Smokeless tobacco: Former     Types: Chew   • Tobacco comments:     30 yrs ago   Substance Use Topics   • Alcohol use: Never       Family History   Problem Relation Age of Onset   • Lung cancer Father         Current Outpatient Medications on File Prior to Visit   Medication Sig   • aspirin 81 MG EC tablet Take 1 tablet by mouth Daily.   • [DISCONTINUED] rosuvastatin (CRESTOR) 10 MG tablet TAKE 1 TABLET BY MOUTH EVERY DAY     No current facility-administered medications on file prior to visit.       Health Maintenance Due   Topic Date Due   • COLORECTAL CANCER SCREENING  Never done   • ANNUAL PHYSICAL  03/14/2023       Objective     /67   Pulse 80   Ht 152.4 cm (60\")   Wt 62.1 kg (137 lb)   SpO2 98%   BMI 26.76 kg/m²       Physical Exam  Constitutional:       General: He is not in acute distress.     Appearance: Normal appearance. He is not ill-appearing.   HENT:      Head: Normocephalic and atraumatic.      Right Ear: Tympanic membrane, ear canal and external ear normal.     "  Left Ear: Tympanic membrane, ear canal and external ear normal.      Nose: Nose normal.   Cardiovascular:      Rate and Rhythm: Normal rate and regular rhythm.      Heart sounds: Normal heart sounds. No murmur heard.  Pulmonary:      Effort: Pulmonary effort is normal. No respiratory distress.      Breath sounds: Normal breath sounds.   Chest:      Chest wall: No tenderness.   Abdominal:      General: Abdomen is flat. Bowel sounds are normal. There is no distension.      Palpations: Abdomen is soft. There is no mass.      Tenderness: There is no abdominal tenderness. There is no guarding.   Musculoskeletal:         General: No swelling or tenderness. Normal range of motion.      Cervical back: Normal range of motion and neck supple.   Skin:     General: Skin is warm and dry.      Findings: No rash.   Neurological:      General: No focal deficit present.      Mental Status: He is alert and oriented to person, place, and time. Mental status is at baseline.      Gait: Gait normal.   Psychiatric:         Mood and Affect: Mood normal.         Behavior: Behavior normal.         Thought Content: Thought content normal.         Judgment: Judgment normal.           Result Review :                           Assessment and Plan        Diagnoses and all orders for this visit:    1. Annual physical exam (Primary)  -     Ambulatory Referral For Screening Colonoscopy    2. Mixed hyperlipidemia  Comments:  stable on crestor 10mg, continue  Orders:  -     rosuvastatin (CRESTOR) 10 MG tablet; Take 1 tablet by mouth Daily.  Dispense: 90 tablet; Refill: 1    3. Screening for colon cancer  -     Ambulatory Referral For Screening Colonoscopy      Preventative Counseling:  Healthy diet  Daily exercise  Get adequate sleep        Follow Up     Return in about 6 months (around 9/15/2023).    Patient was given instructions and counseling regarding his condition or for health maintenance advice. Please see specific information pulled into the  AVS if appropriate.     Rivera SEAY Kings  reports that he has quit smoking. His smoking use included cigarettes. He has a 0.50 pack-year smoking history. He has quit using smokeless tobacco.  His smokeless tobacco use included chew..

## 2023-03-23 ENCOUNTER — OFFICE VISIT (OUTPATIENT)
Dept: NEUROLOGY | Facility: CLINIC | Age: 58
End: 2023-03-23
Payer: COMMERCIAL

## 2023-03-23 VITALS
WEIGHT: 135 LBS | HEART RATE: 83 BPM | BODY MASS INDEX: 26.5 KG/M2 | DIASTOLIC BLOOD PRESSURE: 74 MMHG | SYSTOLIC BLOOD PRESSURE: 111 MMHG | HEIGHT: 60 IN

## 2023-03-23 DIAGNOSIS — G43.109 COMPLICATED MIGRAINE: Primary | ICD-10-CM

## 2023-03-23 PROCEDURE — 99214 OFFICE O/P EST MOD 30 MIN: CPT | Performed by: NURSE PRACTITIONER

## 2023-03-23 RX ORDER — TOPIRAMATE 50 MG/1
50 TABLET, FILM COATED ORAL 2 TIMES DAILY
Qty: 60 TABLET | Refills: 3 | Status: SHIPPED | OUTPATIENT
Start: 2023-03-23

## 2023-03-23 NOTE — PROGRESS NOTES
"Chief Complaint  Neurologic Problem    Subjective          Rivera Ku presents to [facility]  History of Present Illness  Following up for  EEG and lab results.  States he's not had any visual disturbances for the past couple months.  However continues to experience intermittent paresthesia, weakness, headaches.      Interval History:   States over 20 years ago he had an episode of decreased vision.  About once a year he would have visual disturbance that lasted about 5 minutes, sometimes followed by a headache.  Since March 2022, he states the vision issues became more of an issue.  Since October has had visual issues about twice. However, states he feels generally ill. Has had twiching under left eye.  Feels paresthesia to scalp and sides of face.  Occasionally mildly lightheaded. Occasional nausea.  Some fatigue.  Sometimes has a burning sensation in left shoulder.  A couple of times his tongue has felt like sandpaper, dry mouth.        Objective   Vital Signs:   /74   Pulse 83   Ht 152.4 cm (60\")   Wt 61.2 kg (135 lb)   BMI 26.37 kg/m²     Physical Exam  Neurological:      Mental Status: He is oriented to person, place, and time.      Cranial Nerves: Cranial nerves 2-12 are intact.      Gait: Gait is intact.      Deep Tendon Reflexes:      Reflex Scores:       Brachioradialis reflexes are 2+ on the right side and 2+ on the left side.       Patellar reflexes are 2+ on the right side and 2+ on the left side.       Neurologic Exam     Mental Status   Oriented to person, place, and time.     Cranial Nerves   Cranial nerves II through XII intact.     Gait, Coordination, and Reflexes     Gait  Gait: normal    Reflexes   Right brachioradialis: 2+  Left brachioradialis: 2+  Right patellar: 2+  Left patellar: 2+       Result Review :   CBC:  Lab Results   Component Value Date    WBC 5.57 01/31/2023    RBC 5.29 01/31/2023    HGB 15.8 01/31/2023    HCT 46.2 " 01/31/2023    MCV 87.3 01/31/2023    MCH 29.9 01/31/2023    MCHC 34.2 01/31/2023    RDW 12.5 01/31/2023     01/31/2023     CMP:  Lab Results   Component Value Date    BUN 14 01/31/2023    CREATININE 1.07 01/31/2023     01/31/2023    K 4.3 01/31/2023     01/31/2023    CALCIUM 9.3 01/31/2023    ALBUMIN 4.5 01/31/2023    BILITOT 0.6 01/31/2023    ALKPHOS 85 01/31/2023    AST 25 01/31/2023    ALT 30 01/31/2023     B12:   Lab Results   Component Value Date    [id number] 823 01/31/2023      FOLATE:   Lab Results   Component Value Date    FOLATE 15.70 01/31/2023     CRP:   Lab Results   Component Value Date    CRP <0.30 01/31/2023      SED RATE:  Lab Results   Component Value Date    SEDRATE 4 01/31/2023            MRI Brain:                  1. There are scattered white matter changes involving the cerebral hemispheres which could reflect   more chronic small vessel ischemic change.  2. Mucous retention cyst or polyp right maxillary sinus.    EEG:   This EEG which was recorded during wakefulness, drowsiness, stage I and II of sleep is normal.     Assessment and Plan    Diagnoses and all orders for this visit:    1. Complicated migraine (Primary)  Assessment & Plan:  Has had in depth workup thus far that has been negative.  Concern for complicated migraine.  Will start topiramate for preventative therapy.  He will keep a symptom log for review.         Other orders  -     topiramate (TOPAMAX) 50 MG tablet; Take 1 tablet by mouth 2 (Two) Times a Day. Take 1 tablet qHS for 2 weeks, then increase to BID  Dispense: 60 tablet; Refill: 3      Follow Up   Return in about 3 months (around 6/23/2023) for memory f/u.  Patient was given instructions and counseling regarding his condition or for health maintenance advice. Please see specific information pulled into the AVS if appropriate.

## 2023-03-27 NOTE — ASSESSMENT & PLAN NOTE
Has had in depth workup thus far that has been negative.  Concern for complicated migraine.  Will start topiramate for preventative therapy.  He will keep a symptom log for review.

## 2023-05-11 ENCOUNTER — TELEPHONE (OUTPATIENT)
Dept: FAMILY MEDICINE CLINIC | Facility: CLINIC | Age: 58
End: 2023-05-11
Payer: COMMERCIAL

## 2023-05-11 DIAGNOSIS — E78.5 HYPERLIPIDEMIA, UNSPECIFIED HYPERLIPIDEMIA TYPE: Primary | ICD-10-CM

## 2023-05-11 NOTE — TELEPHONE ENCOUNTER
Pt called stating he has stopped his crestor due to S/E. Pt would like to check labs in 6 mo.     Please sign orders.    Please D/C med from list.    03-Jun-2021 17:20

## 2023-08-03 ENCOUNTER — OFFICE VISIT (OUTPATIENT)
Dept: NEUROLOGY | Facility: CLINIC | Age: 58
End: 2023-08-03
Payer: COMMERCIAL

## 2023-08-03 VITALS
WEIGHT: 132.8 LBS | BODY MASS INDEX: 26.07 KG/M2 | SYSTOLIC BLOOD PRESSURE: 114 MMHG | HEART RATE: 61 BPM | DIASTOLIC BLOOD PRESSURE: 66 MMHG | HEIGHT: 60 IN

## 2023-08-03 DIAGNOSIS — G43.109 COMPLICATED MIGRAINE: Primary | ICD-10-CM

## 2023-08-03 RX ORDER — NORTRIPTYLINE HYDROCHLORIDE 25 MG/1
25 CAPSULE ORAL NIGHTLY
Qty: 30 CAPSULE | Refills: 5 | Status: SHIPPED | OUTPATIENT
Start: 2023-08-03 | End: 2023-09-02

## 2023-08-24 ENCOUNTER — PREP FOR SURGERY (OUTPATIENT)
Dept: OTHER | Facility: HOSPITAL | Age: 58
End: 2023-08-24
Payer: COMMERCIAL

## 2023-08-24 ENCOUNTER — OFFICE VISIT (OUTPATIENT)
Dept: SURGERY | Facility: CLINIC | Age: 58
End: 2023-08-24
Payer: COMMERCIAL

## 2023-08-24 VITALS — BODY MASS INDEX: 25.52 KG/M2 | WEIGHT: 130 LBS | HEART RATE: 88 BPM | HEIGHT: 60 IN

## 2023-08-24 DIAGNOSIS — Z12.11 SCREENING FOR MALIGNANT NEOPLASM OF COLON: Primary | ICD-10-CM

## 2023-08-24 RX ORDER — SODIUM CHLORIDE 9 MG/ML
40 INJECTION, SOLUTION INTRAVENOUS AS NEEDED
OUTPATIENT
Start: 2023-08-24

## 2023-08-24 RX ORDER — SODIUM CHLORIDE 0.9 % (FLUSH) 0.9 %
10 SYRINGE (ML) INJECTION AS NEEDED
OUTPATIENT
Start: 2023-08-24

## 2023-08-24 RX ORDER — SODIUM CHLORIDE 0.9 % (FLUSH) 0.9 %
3 SYRINGE (ML) INJECTION EVERY 12 HOURS SCHEDULED
OUTPATIENT
Start: 2023-08-24

## 2023-08-24 RX ORDER — PEG-3350, SODIUM SULFATE, SODIUM CHLORIDE, POTASSIUM CHLORIDE, SODIUM ASCORBATE AND ASCORBIC ACID 7.5-2.691G
1000 KIT ORAL ONCE
Qty: 1000 ML | Refills: 0 | Status: SHIPPED | OUTPATIENT
Start: 2023-08-24 | End: 2023-08-24

## 2023-08-24 NOTE — PROGRESS NOTES
"Chief Complaint: Colonoscopy (consult)    Subjective      Colonoscopy consultation       History of Present Illness  Rivera Ku is a 58 y.o. male presents to [facility] for colonoscopy consultation.    Patient presents today on referral from Kayce Simmons for colonoscopy consultation.  Patient denies any abdominal pain, change in bowel habit, or rectal bleeding.  Denies any family history of colorectal cancer.  No previous colonoscopy.    Patient having some PACs per Dr. Todd.  I will get cardiac clearance prior to the procedure.    Objective     Past Medical History:   Diagnosis Date    Hyperlipidemia     Ocular migraine 1/31/2023       History reviewed. No pertinent surgical history.    Outpatient Medications Marked as Taking for the 8/24/23 encounter (Office Visit) with Ramesh April, APRN   Medication Sig Dispense Refill    aspirin 81 MG EC tablet Take 1 tablet by mouth Daily. 30 tablet 5    nortriptyline (PAMELOR) 25 MG capsule Take 1 capsule by mouth Every Night for 30 days. 30 capsule 5    pantoprazole (PROTONIX) 40 MG EC tablet TAKE 1 TABLET BY MOUTH EVERY DAY 90 tablet 0       No Known Allergies     Family History   Problem Relation Age of Onset    Lung cancer Father        Social History     Socioeconomic History    Marital status:    Tobacco Use    Smoking status: Former     Packs/day: 0.25     Years: 2.00     Pack years: 0.50     Types: Cigarettes    Smokeless tobacco: Former     Types: Chew    Tobacco comments:     30 yrs ago   Vaping Use    Vaping Use: Never used   Substance and Sexual Activity    Alcohol use: Never    Drug use: Never    Sexual activity: Defer       Review of Systems   Constitutional:  Negative for chills and fever.   Gastrointestinal:  Negative for abdominal distention, abdominal pain, anal bleeding, blood in stool, constipation, diarrhea and rectal pain.      Vital Signs:   Pulse 88   Ht 152.4 cm (60\")   Wt 59 kg (130 lb)   BMI " 25.39 kg/mý      Physical Exam  Vitals and nursing note reviewed.   Constitutional:       General: He is not in acute distress.     Appearance: Normal appearance.   HENT:      Head: Normocephalic.   Cardiovascular:      Rate and Rhythm: Normal rate.   Pulmonary:      Effort: Pulmonary effort is normal.      Breath sounds: No stridor.   Abdominal:      Palpations: Abdomen is soft.      Tenderness: There is no guarding.   Musculoskeletal:         General: No deformity. Normal range of motion.   Skin:     General: Skin is warm and dry.      Coloration: Skin is not jaundiced.   Neurological:      General: No focal deficit present.      Mental Status: He is alert and oriented to person, place, and time.   Psychiatric:         Mood and Affect: Mood normal.         Thought Content: Thought content normal.        Result Review :          []  Laboratory  []  Radiology  []  Pathology  []  Microbiology  []  EKG/Telemetry   []  Cardiology/Vascular   []  Old records  I spent 15 minutes caring for Rivera on this date of service. This time includes time spent by me in the following activities: reviewing tests, obtaining and/or reviewing a separately obtained history, performing a medically appropriate examination and/or evaluation, ordering medications, tests, or procedures, and documenting information in the medical record.     Assessment and Plan    Diagnoses and all orders for this visit:    1. Screening for malignant neoplasm of colon (Primary)    Other orders  -     PEG-KCl-NaCl-NaSulf-Na Asc-C (MOVIPREP) 100 g reconstituted solution powder; Take 1,000 mL by mouth 1 (One) Time for 1 dose.  Dispense: 1000 mL; Refill: 0        Follow Up   Return for Schedule colonoscopy with Dr. Herrera on 1/18/2024 at [facility].    Hospital arrival time: 10:00.    Possible risks/complications, benefits, and alternatives to surgical or invasive procedures have been explained to patient and/or legal guardian.    Patient has been  evaluated and can tolerate anesthesia and/or sedation. Risks, benefits, and alternatives to anesthesia and sedation have been explained to the patient and/or legal guardian. Patient verbalizes understanding and is willing to proceed with the above plan.     Patient was given instructions and counseling regarding his condition or for health maintenance advice. Please see specific information pulled into the AVS if appropriate.

## 2023-09-25 DIAGNOSIS — K21.9 GASTROESOPHAGEAL REFLUX DISEASE WITHOUT ESOPHAGITIS: ICD-10-CM

## 2023-09-26 RX ORDER — PANTOPRAZOLE SODIUM 40 MG/1
TABLET, DELAYED RELEASE ORAL
Qty: 90 TABLET | Refills: 0 | Status: SHIPPED | OUTPATIENT
Start: 2023-09-26

## 2023-11-29 ENCOUNTER — TELEPHONE (OUTPATIENT)
Dept: SURGERY | Facility: CLINIC | Age: 58
End: 2023-11-29
Payer: COMMERCIAL

## 2023-11-29 NOTE — TELEPHONE ENCOUNTER
[facility] [place]  [facility]  [address]  [place] [address]  Fax [phone]  Phone [phone]     To whom it may concern:    I am writing on behalf of our mutual patient Rivera Ku 1965     Rivera Ku  is scheduled to have a colonoscopy by Paul Herrera which will be performed at [facility] on 1/18/24. Please respond to this request noting your recommendations regarding clearance from the cardiology standpoint. You may contact our office at [phone] with any questions. I appreciate your prompt response to this matter. Please return this form to our office as soon as possible to [phone]. Rivera Ku is scheduled for this procedure pending your approval. Thank you for your time and assistance.     [] I approve my patient, Rivera Ku , to proceed with the surgical procedure listed above.   [] I do NOT approve my patient, Rivera Ku , to proceed with the surgical procedure listed above.   [] I approve my patient, Rivera Ku , from a cardiology standpoint.   [] I do NOT approve my patient, Rivera Ku , from a cardiology standpoint at this time.       Approving physician name(please print): ________________________________________    Approving physician signature: _____________________________________________     Date: ____________________________    Sincerely,   Sarah BAZZI

## 2023-11-29 NOTE — TELEPHONE ENCOUNTER
Procedure: Colonoscopy and/or EGD    Medication Directive: NA    PMH: Palpitation, HLD    Last Seen: 12/22/22    ECHO: 12/29/22    Normal left ventricular systolic function with an estimated ejection fraction of 65%.  No regional wall motion abnormalities were observed.  Left ventricular diastolic function was indeterminate.  Mild mitral regurgitation and mild tricuspid regurgitation, but no hemodynamically significant valvular pathology.  Estimated right ventricular systolic pressure was within normal limits.  No evidence of pericardial effusion.

## 2024-01-03 ENCOUNTER — OFFICE VISIT (OUTPATIENT)
Dept: NEUROLOGY | Facility: CLINIC | Age: 59
End: 2024-01-03
Payer: COMMERCIAL

## 2024-01-03 VITALS
WEIGHT: 139 LBS | SYSTOLIC BLOOD PRESSURE: 136 MMHG | HEART RATE: 100 BPM | BODY MASS INDEX: 27.29 KG/M2 | HEIGHT: 60 IN | DIASTOLIC BLOOD PRESSURE: 80 MMHG

## 2024-01-03 DIAGNOSIS — G43.109 COMPLICATED MIGRAINE: Primary | ICD-10-CM

## 2024-01-03 RX ORDER — NORTRIPTYLINE HYDROCHLORIDE 25 MG/1
25 CAPSULE ORAL NIGHTLY
Qty: 90 CAPSULE | Refills: 1 | Status: SHIPPED | OUTPATIENT
Start: 2024-01-03 | End: 2024-04-02

## 2024-01-03 NOTE — PROGRESS NOTES
"Chief Complaint  Neurologic Problem    Subjective          Rivera Ku presents to [facility]  History of Present Illness  Following up for complex migraine. Has only had a couple of days of visual deficits since. Felt bad for about 2 days just prior to marisa.  Overall has seen good improvement with nortriptyline and denies side effects.     Interval History:   States over 20 years ago he had an episode of decreased vision.  About once a year he would have visual disturbance that lasted about 5 minutes, sometimes followed by a headache.  Since March 2022, he states the vision issues became more of an issue.  Since October has had visual issues about twice. However, states he feels generally ill. Has had twiching under left eye.  Feels paresthesia to scalp and sides of face.  Occasionally mildly lightheaded. Occasional nausea.  Some fatigue.  Sometimes has a burning sensation in left shoulder.  A couple of times his tongue has felt like sandpaper, dry mouth.        Objective   Vital Signs:   /80   Pulse 100   Ht 152.4 cm (60\")   Wt 63 kg (139 lb)   BMI 27.15 kg/m²     Physical Exam  HENT:      Head: Normocephalic.   Pulmonary:      Effort: Pulmonary effort is normal.   Neurological:      Mental Status: He is alert and oriented to person, place, and time.      Sensory: Sensation is intact.      Motor: Motor function is intact.      Coordination: Coordination is intact.      Deep Tendon Reflexes: Reflexes are normal and symmetric.        Neurologic Exam     Mental Status   Oriented to person, place, and time.        Result Review :   CBC:  Lab Results   Component Value Date    WBC 5.57 01/31/2023    RBC 5.29 01/31/2023    HGB 15.8 01/31/2023    HCT 46.2 01/31/2023    MCV 87.3 01/31/2023    MCH 29.9 01/31/2023    MCHC 34.2 01/31/2023    RDW 12.5 01/31/2023     01/31/2023     CMP:  Lab Results   Component Value Date    BUN 14 01/31/2023    CREATININE 1.07 " 01/31/2023     01/31/2023    K 4.3 01/31/2023     01/31/2023    CALCIUM 9.3 01/31/2023    ALBUMIN 4.5 01/31/2023    BILITOT 0.6 01/31/2023    ALKPHOS 85 01/31/2023    AST 25 01/31/2023    ALT 30 01/31/2023     B12:   Lab Results   Component Value Date    [id number] 823 01/31/2023      FOLATE:   Lab Results   Component Value Date    FOLATE 15.70 01/31/2023               Assessment and Plan    Diagnoses and all orders for this visit:    1. Complicated migraine (Primary)  Assessment & Plan:  Concern for complicated migraine.  Continue nortriptyline for preventative therapy.      Other orders  -     nortriptyline (PAMELOR) 25 MG capsule; Take 1 capsule by mouth Every Night for 90 days.  Dispense: 90 capsule; Refill: 1        Follow Up   Return in about 6 months (around 7/3/2024) for Migraine f/u.  Patient was given instructions and counseling regarding his condition or for health maintenance advice. Please see specific information pulled into the AVS if appropriate.

## 2024-01-04 DIAGNOSIS — K21.9 GASTROESOPHAGEAL REFLUX DISEASE WITHOUT ESOPHAGITIS: ICD-10-CM

## 2024-01-04 RX ORDER — PANTOPRAZOLE SODIUM 40 MG/1
40 TABLET, DELAYED RELEASE ORAL DAILY
Qty: 30 TABLET | Refills: 0 | Status: SHIPPED | OUTPATIENT
Start: 2024-01-04

## 2024-01-05 NOTE — TELEPHONE ENCOUNTER
30 day supply given pt needs appt for any further refills  
LRF 09/26/2023  LOV 03/15/2023  F/U none on file   
SCHEDULED 01/30/2024   
General Sunscreen Counseling: I recommended a broad spectrum sunscreen with a SPF of 30 or higher.  I explained that SPF 30 sunscreens block approximately 97 percent of the sun's harmful rays.  Sunscreens should be applied at least 15 minutes prior to expected sun exposure and then every 2 hours after that as long as sun exposure continues. If swimming or exercising sunscreen should be reapplied every 45 minutes to an hour after getting wet or sweating.  One ounce, or the equivalent of a shot glass full of sunscreen, is adequate to protect the skin not covered by a bathing suit. I also recommended a lip balm with a sunscreen as well. Sun protective clothing can be used in lieu of sunscreen but must be worn the entire time you are exposed to the sun's rays.
Detail Level: Generalized

## 2024-01-11 NOTE — SIGNIFICANT NOTE
Arrival time of 1000.    Must have a  for transportation home post-procedure.     Education provided on laxative administration; bowel prep to be taken in two doses. Reviewed diet instructions for day prior to procedure. Only plain, unflavored water after midnight until two hours prior to arrival time.     Do not take any morning medications on the day of the procedure. Instead bring all prescribed medications and inhalers to the hospital on the morning of the procedure.    Instructed to take any nebulizer treatments prior to coming on the morning of the procedure.     Patient verbalized understanding of instructions.

## 2024-01-17 ENCOUNTER — ANESTHESIA EVENT (OUTPATIENT)
Dept: GASTROENTEROLOGY | Facility: HOSPITAL | Age: 59
End: 2024-01-17
Payer: COMMERCIAL

## 2024-01-17 NOTE — ANESTHESIA PREPROCEDURE EVALUATION
Anesthesia Evaluation     Patient summary reviewed and Nursing notes reviewed   NPO Solid Status: > 8 hours  NPO Liquid Status: > 8 hours           Airway   Mallampati: I  TM distance: >3 FB  Neck ROM: full  No difficulty expected  Dental    (+) partials    Comment: Upper partials removed prior to procedure     Pulmonary     breath sounds clear to auscultation  Cardiovascular   Exercise tolerance: good (4-7 METS)    ECG reviewed  Rhythm: regular  Rate: abnormal    (+) hyperlipidemia    PE comment:  on monitor - patient is asymptomatic- will order an EKG in preop    Neuro/Psych  (+) headaches  GI/Hepatic/Renal/Endo      Musculoskeletal     Abdominal    Substance History      OB/GYN          Other        ROS/Med Hx Other: ECHO 12/29/22:   Normal left ventricular systolic function with an estimated ejection fraction of 65%.  No regional wall motion abnormalities were observed.  Left ventricular diastolic function was indeterminate.  Mild mitral regurgitation and mild tricuspid regurgitation, but no hemodynamically significant valvular pathology.  Estimated right ventricular systolic pressure was within normal limits.  No evidence of pericardial effusion.    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  )  intravenous induction     Anesthetic plan, risks, benefits, and alternatives have been provided, discussed and informed consent has been obtained with: patient and spouse/significant other.  Pre-procedure education provided  Plan discussed with CRNA.      CODE STATUS:

## 2024-01-18 ENCOUNTER — HOSPITAL ENCOUNTER (OUTPATIENT)
Facility: HOSPITAL | Age: 59
Setting detail: HOSPITAL OUTPATIENT SURGERY
Discharge: HOME | End: 2024-01-18
Attending: SURGERY | Admitting: SURGERY
Payer: COMMERCIAL

## 2024-01-18 ENCOUNTER — ANESTHESIA (OUTPATIENT)
Dept: GASTROENTEROLOGY | Facility: HOSPITAL | Age: 59
End: 2024-01-18
Payer: COMMERCIAL

## 2024-01-18 VITALS
TEMPERATURE: 98.1 F | RESPIRATION RATE: 16 BRPM | DIASTOLIC BLOOD PRESSURE: 83 MMHG | HEART RATE: 103 BPM | SYSTOLIC BLOOD PRESSURE: 115 MMHG | HEIGHT: 61 IN | WEIGHT: 132.72 LBS | OXYGEN SATURATION: 96 % | BODY MASS INDEX: 25.06 KG/M2

## 2024-01-18 DIAGNOSIS — Z12.11 SCREENING FOR MALIGNANT NEOPLASM OF COLON: ICD-10-CM

## 2024-01-18 LAB
QT INTERVAL: 335 MS
QTC INTERVAL: 441 MS

## 2024-01-18 PROCEDURE — 93010 ELECTROCARDIOGRAM REPORT: CPT | Performed by: INTERNAL MEDICINE

## 2024-01-18 PROCEDURE — 93005 ELECTROCARDIOGRAM TRACING: CPT

## 2024-01-18 PROCEDURE — 25810000003 LACTATED RINGERS PER 1000 ML

## 2024-01-18 PROCEDURE — 25010000002 PROPOFOL 10 MG/ML EMULSION

## 2024-01-18 RX ORDER — LIDOCAINE HYDROCHLORIDE 20 MG/ML
INJECTION, SOLUTION EPIDURAL; INFILTRATION; INTRACAUDAL; PERINEURAL AS NEEDED
Status: DISCONTINUED | OUTPATIENT
Start: 2024-01-18 | End: 2024-01-18 | Stop reason: SURG

## 2024-01-18 RX ORDER — SODIUM CHLORIDE 9 MG/ML
40 INJECTION, SOLUTION INTRAVENOUS AS NEEDED
Status: DISCONTINUED | OUTPATIENT
Start: 2024-01-18 | End: 2024-01-18 | Stop reason: HOSPADM

## 2024-01-18 RX ORDER — SODIUM CHLORIDE, SODIUM LACTATE, POTASSIUM CHLORIDE, CALCIUM CHLORIDE 600; 310; 30; 20 MG/100ML; MG/100ML; MG/100ML; MG/100ML
30 INJECTION, SOLUTION INTRAVENOUS CONTINUOUS
Status: DISCONTINUED | OUTPATIENT
Start: 2024-01-18 | End: 2024-01-18 | Stop reason: HOSPADM

## 2024-01-18 RX ORDER — SODIUM CHLORIDE 0.9 % (FLUSH) 0.9 %
3 SYRINGE (ML) INJECTION EVERY 12 HOURS SCHEDULED
Status: DISCONTINUED | OUTPATIENT
Start: 2024-01-18 | End: 2024-01-18 | Stop reason: HOSPADM

## 2024-01-18 RX ORDER — SODIUM CHLORIDE 0.9 % (FLUSH) 0.9 %
10 SYRINGE (ML) INJECTION AS NEEDED
Status: DISCONTINUED | OUTPATIENT
Start: 2024-01-18 | End: 2024-01-18 | Stop reason: HOSPADM

## 2024-01-18 RX ORDER — PROPOFOL 10 MG/ML
VIAL (ML) INTRAVENOUS AS NEEDED
Status: DISCONTINUED | OUTPATIENT
Start: 2024-01-18 | End: 2024-01-18 | Stop reason: SURG

## 2024-01-18 RX ADMIN — SODIUM CHLORIDE, POTASSIUM CHLORIDE, SODIUM LACTATE AND CALCIUM CHLORIDE 30 ML/HR: 600; 310; 30; 20 INJECTION, SOLUTION INTRAVENOUS at 10:21

## 2024-01-18 RX ADMIN — LIDOCAINE HYDROCHLORIDE 50 MG: 20 INJECTION, SOLUTION EPIDURAL; INFILTRATION; INTRACAUDAL; PERINEURAL at 10:37

## 2024-01-18 RX ADMIN — PROPOFOL 200 MCG/KG/MIN: 10 INJECTION, EMULSION INTRAVENOUS at 10:37

## 2024-01-18 RX ADMIN — PROPOFOL 100 MG: 10 INJECTION, EMULSION INTRAVENOUS at 10:37

## 2024-01-18 NOTE — ANESTHESIA POSTPROCEDURE EVALUATION
Patient: Rivera Ku    Procedure Summary       Date: 01/18/24 Room / Location: [facility] ENDOSCOPY 1 / [facility] ENDOSCOPY    Anesthesia Start: 1035 Anesthesia Stop: 1053    Procedure: COLONOSCOPY Diagnosis:       Screening for malignant neoplasm of colon      (Screening for malignant neoplasm of colon [Z12.11])    Surgeons: Paul Herrera MD Provider: Edgardo Pastor CRNA    Anesthesia Type: general ASA Status: 3            Anesthesia Type: general    Vitals  Vitals Value Taken Time   /83 01/18/24 1108   Temp 36.7 °C (98.1 °F) 01/18/24 1105   Pulse 103 01/18/24 1107   Resp 16 01/18/24 1105   SpO2 96 % 01/18/24 1107   Vitals shown include unfiled device data.        Post Anesthesia Care and Evaluation    Post-procedure mental status: acceptable.  Pain management: satisfactory to patient    Airway patency: patent  Anesthetic complications: No anesthetic complications    Cardiovascular status: acceptable  Respiratory status: acceptable    Comments: Per chart review

## 2024-01-18 NOTE — H&P
General Surgery/Colorectal Surgery Note    Patient Name:  Rivera Ku  YOB: 1965  [id number]    Referring Provider: Paul Herrera, *      Patient Care Team:  Kayce Tolentino APRN as PCP - General (Nurse Practitioner)    Subjective .     History of present illness:    HPI   referral from Kayce Simmons for colonoscopy consultation.  Patient denies any abdominal pain, change in bowel habit, or rectal bleeding.  Denies any family history of colorectal cancer.  No previous colonoscopy.     History:  Past Medical History:   Diagnosis Date    Hyperlipidemia     Ocular migraine 1/31/2023       No past surgical history on file.    Family History   Problem Relation Age of Onset    Lung cancer Father        Social History     Tobacco Use    Smoking status: Former     Packs/day: 0.25     Years: 2.00     Additional pack years: 0.00     Total pack years: 0.50     Types: Cigarettes    Smokeless tobacco: Former     Types: Chew    Tobacco comments:     30 yrs ago   Vaping Use    Vaping Use: Never used   Substance Use Topics    Alcohol use: Never    Drug use: Never       Review of Systems: See HPI    Review of Systems            Medications Prior to Admission   Medication Sig Dispense Refill Last Dose    aspirin 81 MG EC tablet Take 1 tablet by mouth Daily. 30 tablet 5     nortriptyline (PAMELOR) 25 MG capsule Take 1 capsule by mouth Every Night for 90 days. 90 capsule 1     pantoprazole (PROTONIX) 40 MG EC tablet Take 1 tablet by mouth Daily. 30 tablet 0     rosuvastatin (CRESTOR) 10 MG tablet Take 1 tablet by mouth Daily. (Patient not taking: Reported on 8/3/2023) 90 tablet 1          Home Meds:      Prior to Admission medications    Medication Sig Start Date End Date Taking? Authorizing Provider   aspirin 81 MG EC tablet Take 1 tablet by mouth Daily. 12/7/22   Ever Todd MD   nortriptyline (PAMELOR) 25 MG capsule Take 1 capsule by mouth Every Night for 90 days. 1/3/24 4/2/24  Judy  ROSE MARY Eduardo   pantoprazole (PROTONIX) 40 MG EC tablet Take 1 tablet by mouth Daily. 1/4/24   Kayce Tolentino APRN   rosuvastatin (CRESTOR) 10 MG tablet Take 1 tablet by mouth Daily.  Patient not taking: Reported on 8/3/2023 3/15/23   Kayce Tolentino APRN            Allergies:  Patient has no known allergies.      Objective     Vital Signs        Physical Exam:     Physical Exam    NAD, A/O x 4, normal circulation, normal respiration      Result Review :     Assessment & Plan     There are no diagnoses linked to this encounter.       Risks including bleeding, perforation, pain, infection, missed polyp(s). Benefits, and alternatives of Colonoscopy discussed with patient.  All questions answered.  Consent verified.         Paul Herrera MD  01/18/24 09:37 EST

## 2024-01-24 ENCOUNTER — LAB (OUTPATIENT)
Dept: LAB | Facility: HOSPITAL | Age: 59
End: 2024-01-24
Payer: COMMERCIAL

## 2024-01-24 DIAGNOSIS — E78.5 HYPERLIPIDEMIA, UNSPECIFIED HYPERLIPIDEMIA TYPE: ICD-10-CM

## 2024-01-24 LAB
ALBUMIN SERPL-MCNC: 4.3 G/DL (ref 3.5–5.2)
ALBUMIN/GLOB SERPL: 1.7 G/DL
ALP SERPL-CCNC: 99 U/L (ref 39–117)
ALT SERPL W P-5'-P-CCNC: 58 U/L (ref 1–41)
ANION GAP SERPL CALCULATED.3IONS-SCNC: 7 MMOL/L (ref 5–15)
AST SERPL-CCNC: 24 U/L (ref 1–40)
BILIRUB SERPL-MCNC: 0.6 MG/DL (ref 0–1.2)
BUN SERPL-MCNC: 17 MG/DL (ref 8–23)
BUN/CREAT SERPL: 13.1 (ref 7–25)
CALCIUM SPEC-SCNC: 9.1 MG/DL (ref 8.6–10.5)
CHLORIDE SERPL-SCNC: 103 MMOL/L (ref 98–107)
CHOLEST SERPL-MCNC: 253 MG/DL (ref 0–200)
CO2 SERPL-SCNC: 29 MMOL/L (ref 22–29)
CREAT SERPL-MCNC: 1.3 MG/DL (ref 0.76–1.27)
EGFRCR SERPLBLD CKD-EPI 2021: 62.9 ML/MIN/1.73
GLOBULIN UR ELPH-MCNC: 2.6 GM/DL
GLUCOSE SERPL-MCNC: 82 MG/DL (ref 65–99)
HDLC SERPL-MCNC: 50 MG/DL (ref 40–60)
LDLC SERPL CALC-MCNC: 173 MG/DL (ref 0–100)
LDLC/HDLC SERPL: 3.4 {RATIO}
POTASSIUM SERPL-SCNC: 4.1 MMOL/L (ref 3.5–5.2)
PROT SERPL-MCNC: 6.9 G/DL (ref 6–8.5)
SODIUM SERPL-SCNC: 139 MMOL/L (ref 136–145)
TRIGL SERPL-MCNC: 166 MG/DL (ref 0–150)
VLDLC SERPL-MCNC: 30 MG/DL (ref 5–40)

## 2024-01-24 PROCEDURE — 36415 COLL VENOUS BLD VENIPUNCTURE: CPT

## 2024-01-24 PROCEDURE — 80061 LIPID PANEL: CPT

## 2024-01-24 PROCEDURE — 80053 COMPREHEN METABOLIC PANEL: CPT

## 2024-01-25 DIAGNOSIS — E78.5 HYPERLIPIDEMIA, UNSPECIFIED HYPERLIPIDEMIA TYPE: Primary | ICD-10-CM

## 2024-01-30 ENCOUNTER — OFFICE VISIT (OUTPATIENT)
Dept: FAMILY MEDICINE CLINIC | Facility: CLINIC | Age: 59
End: 2024-01-30
Payer: COMMERCIAL

## 2024-01-30 VITALS
OXYGEN SATURATION: 97 % | WEIGHT: 140 LBS | BODY MASS INDEX: 26.43 KG/M2 | DIASTOLIC BLOOD PRESSURE: 69 MMHG | HEART RATE: 82 BPM | HEIGHT: 61 IN | SYSTOLIC BLOOD PRESSURE: 108 MMHG

## 2024-01-30 DIAGNOSIS — G43.801 OTHER MIGRAINE WITH STATUS MIGRAINOSUS, NOT INTRACTABLE: ICD-10-CM

## 2024-01-30 DIAGNOSIS — E78.5 HYPERLIPIDEMIA, UNSPECIFIED HYPERLIPIDEMIA TYPE: ICD-10-CM

## 2024-01-30 DIAGNOSIS — Z13.29 SCREENING FOR THYROID DISORDER: ICD-10-CM

## 2024-01-30 DIAGNOSIS — K21.9 GASTROESOPHAGEAL REFLUX DISEASE WITHOUT ESOPHAGITIS: Primary | ICD-10-CM

## 2024-01-30 DIAGNOSIS — Z23 NEED FOR TDAP VACCINATION: ICD-10-CM

## 2024-01-30 DIAGNOSIS — Z12.5 SCREENING FOR PROSTATE CANCER: ICD-10-CM

## 2024-01-30 PROCEDURE — 90471 IMMUNIZATION ADMIN: CPT | Performed by: NURSE PRACTITIONER

## 2024-01-30 PROCEDURE — 99214 OFFICE O/P EST MOD 30 MIN: CPT | Performed by: NURSE PRACTITIONER

## 2024-01-30 PROCEDURE — 90715 TDAP VACCINE 7 YRS/> IM: CPT | Performed by: NURSE PRACTITIONER

## 2024-01-30 RX ORDER — PANTOPRAZOLE SODIUM 40 MG/1
40 TABLET, DELAYED RELEASE ORAL DAILY
Qty: 90 TABLET | Refills: 1 | Status: SHIPPED | OUTPATIENT
Start: 2024-01-30

## 2024-01-30 NOTE — PROGRESS NOTES
"Chief Complaint  Heartburn and Migraine  Subjective            Rivera Ku presents to [facility]  History of Present Illness  Pt is a f/u for migraines and GERD. No issues or concerns at this time. He requests a refill on his Protonix.    Pt is due labs/orders placed.    Pt is followed by Alesha Kim with neurology.    Pt is due Tdap and shingles vaccines. Pt will get Tdap today. Pt declines shingles and understands the risks of not having.         Past Medical History:   Diagnosis Date    Elevated cholesterol     GERD (gastroesophageal reflux disease)     Hyperlipidemia     Ocular migraine 01/31/2023       No Known Allergies     Past Surgical History:   Procedure Laterality Date    COLONOSCOPY      COLONOSCOPY N/A 1/18/2024    Procedure: COLONOSCOPY;  Surgeon: Paul Herrera MD;  Location: [facility] ENDOSCOPY;  Service: General;  Laterality: N/A;  NORMAL        Social History     Tobacco Use    Smoking status: Former     Packs/day: 0.25     Years: 2.00     Additional pack years: 0.00     Total pack years: 0.50     Types: Cigarettes    Smokeless tobacco: Former     Types: Chew    Tobacco comments:     30 yrs ago   Substance Use Topics    Alcohol use: Never       Family History   Problem Relation Age of Onset    Lung cancer Father         Current Outpatient Medications on File Prior to Visit   Medication Sig    aspirin 81 MG EC tablet Take 1 tablet by mouth Daily.    nortriptyline (PAMELOR) 25 MG capsule Take 1 capsule by mouth Every Night for 90 days.    [DISCONTINUED] pantoprazole (PROTONIX) 40 MG EC tablet Take 1 tablet by mouth Daily.     No current facility-administered medications on file prior to visit.       There are no preventive care reminders to display for this patient.      Objective     /69   Pulse 82   Ht 154.9 cm (61\")   Wt 63.5 kg (140 lb)   SpO2 97%   BMI 26.45 kg/m²       Physical Exam  Constitutional:       General: He is not in acute " distress.     Appearance: Normal appearance. He is not ill-appearing.   HENT:      Head: Normocephalic and atraumatic.      Right Ear: Tympanic membrane, ear canal and external ear normal.      Left Ear: Tympanic membrane, ear canal and external ear normal.      Nose: Nose normal.   Cardiovascular:      Rate and Rhythm: Normal rate and regular rhythm.      Heart sounds: Normal heart sounds. No murmur heard.  Pulmonary:      Effort: Pulmonary effort is normal. No respiratory distress.      Breath sounds: Normal breath sounds.   Chest:      Chest wall: No tenderness.   Abdominal:      General: Abdomen is flat. Bowel sounds are normal. There is no distension.      Palpations: Abdomen is soft. There is no mass.      Tenderness: There is no abdominal tenderness. There is no guarding.   Musculoskeletal:         General: No swelling or tenderness. Normal range of motion.      Cervical back: Normal range of motion and neck supple.   Skin:     General: Skin is warm and dry.      Findings: No rash.   Neurological:      General: No focal deficit present.      Mental Status: He is alert and oriented to person, place, and time. Mental status is at baseline.      Gait: Gait normal.   Psychiatric:         Mood and Affect: Mood normal.         Behavior: Behavior normal.         Thought Content: Thought content normal.         Judgment: Judgment normal.         BMI is >= 25 and <30. (Overweight) The following options were offered after discussion;: exercise counseling/recommendations and nutrition counseling/recommendations        Result Review :                           Assessment and Plan        Diagnoses and all orders for this visit:    1. Gastroesophageal reflux disease without esophagitis (Primary)  Comments:  stableon protonix 40mg, continue  Orders:  -     pantoprazole (PROTONIX) 40 MG EC tablet; Take 1 tablet by mouth Daily.  Dispense: 90 tablet; Refill: 1    2. Hyperlipidemia, unspecified hyperlipidemia  type  Comments:  managing with diet and exericse, will recheck Lipid panel  Orders:  -     CBC w AUTO Differential; Future    3. Screening for thyroid disorder  -     TSH; Future    4. Screening for prostate cancer  -     PSA SCREENING; Future    5. Need for Tdap vaccination  -     Tdap Vaccine Greater Than or Equal To 8yo IM    6. Other migraine with status migrainosus, not intractable  Comments:  Continue to f/u with [facility] for mgmt              Follow Up     Return in about 6 months (around 7/30/2024).    Patient was given instructions and counseling regarding his condition or for health maintenance advice. Please see specific information pulled into the AVS if appropriate.     Rivera SEAY Kings  reports that he has quit smoking. His smoking use included cigarettes. He has a 0.50 pack-year smoking history. He has quit using smokeless tobacco.  His smokeless tobacco use included chew..

## 2024-03-12 ENCOUNTER — LAB (OUTPATIENT)
Dept: LAB | Facility: HOSPITAL | Age: 59
End: 2024-03-12
Payer: COMMERCIAL

## 2024-03-12 DIAGNOSIS — Z12.5 SCREENING FOR PROSTATE CANCER: ICD-10-CM

## 2024-03-12 DIAGNOSIS — Z13.29 SCREENING FOR THYROID DISORDER: ICD-10-CM

## 2024-03-12 DIAGNOSIS — E78.5 HYPERLIPIDEMIA, UNSPECIFIED HYPERLIPIDEMIA TYPE: ICD-10-CM

## 2024-03-12 LAB
BASOPHILS # BLD AUTO: 0.07 10*3/MM3 (ref 0–0.2)
BASOPHILS NFR BLD AUTO: 1 % (ref 0–1.5)
DEPRECATED RDW RBC AUTO: 42 FL (ref 37–54)
EOSINOPHIL # BLD AUTO: 0.3 10*3/MM3 (ref 0–0.4)
EOSINOPHIL NFR BLD AUTO: 4.2 % (ref 0.3–6.2)
ERYTHROCYTE [DISTWIDTH] IN BLOOD BY AUTOMATED COUNT: 13.1 % (ref 12.3–15.4)
HCT VFR BLD AUTO: 44.2 % (ref 37.5–51)
HGB BLD-MCNC: 14.8 G/DL (ref 13–17.7)
IMM GRANULOCYTES # BLD AUTO: 0.02 10*3/MM3 (ref 0–0.05)
IMM GRANULOCYTES NFR BLD AUTO: 0.3 % (ref 0–0.5)
LYMPHOCYTES # BLD AUTO: 2.05 10*3/MM3 (ref 0.7–3.1)
LYMPHOCYTES NFR BLD AUTO: 29 % (ref 19.6–45.3)
MCH RBC QN AUTO: 29.7 PG (ref 26.6–33)
MCHC RBC AUTO-ENTMCNC: 33.5 G/DL (ref 31.5–35.7)
MCV RBC AUTO: 88.8 FL (ref 79–97)
MONOCYTES # BLD AUTO: 0.74 10*3/MM3 (ref 0.1–0.9)
MONOCYTES NFR BLD AUTO: 10.5 % (ref 5–12)
NEUTROPHILS NFR BLD AUTO: 3.89 10*3/MM3 (ref 1.7–7)
NEUTROPHILS NFR BLD AUTO: 55 % (ref 42.7–76)
NRBC BLD AUTO-RTO: 0 /100 WBC (ref 0–0.2)
PLATELET # BLD AUTO: 242 10*3/MM3 (ref 140–450)
PMV BLD AUTO: 10.9 FL (ref 6–12)
PSA SERPL-MCNC: 5.19 NG/ML (ref 0–4)
RBC # BLD AUTO: 4.98 10*6/MM3 (ref 4.14–5.8)
TSH SERPL DL<=0.05 MIU/L-ACNC: 3.4 UIU/ML (ref 0.27–4.2)
WBC NRBC COR # BLD AUTO: 7.07 10*3/MM3 (ref 3.4–10.8)

## 2024-03-12 PROCEDURE — 84443 ASSAY THYROID STIM HORMONE: CPT

## 2024-03-12 PROCEDURE — G0103 PSA SCREENING: HCPCS

## 2024-03-12 PROCEDURE — 85025 COMPLETE CBC W/AUTO DIFF WBC: CPT

## 2024-03-12 PROCEDURE — 36415 COLL VENOUS BLD VENIPUNCTURE: CPT

## 2024-03-13 DIAGNOSIS — R97.20 ELEVATED PSA: Primary | ICD-10-CM

## 2024-04-19 ENCOUNTER — OFFICE VISIT (OUTPATIENT)
Dept: UROLOGY | Facility: CLINIC | Age: 59
End: 2024-04-19
Payer: COMMERCIAL

## 2024-04-19 VITALS
HEART RATE: 96 BPM | WEIGHT: 140 LBS | DIASTOLIC BLOOD PRESSURE: 84 MMHG | HEIGHT: 61 IN | BODY MASS INDEX: 26.43 KG/M2 | SYSTOLIC BLOOD PRESSURE: 121 MMHG

## 2024-04-19 DIAGNOSIS — R97.20 ELEVATED PROSTATE SPECIFIC ANTIGEN (PSA): ICD-10-CM

## 2024-04-19 DIAGNOSIS — N40.1 BENIGN PROSTATIC HYPERPLASIA WITH WEAK URINARY STREAM: Primary | ICD-10-CM

## 2024-04-19 DIAGNOSIS — R39.12 BENIGN PROSTATIC HYPERPLASIA WITH WEAK URINARY STREAM: Primary | ICD-10-CM

## 2024-04-19 LAB
BILIRUB BLD-MCNC: NEGATIVE MG/DL
CLARITY, POC: CLEAR
COLOR UR: YELLOW
EXPIRATION DATE: NORMAL
GLUCOSE UR STRIP-MCNC: NEGATIVE MG/DL
KETONES UR QL: NEGATIVE
LEUKOCYTE EST, POC: NEGATIVE
Lab: NORMAL
NITRITE UR-MCNC: NEGATIVE MG/ML
PH UR: 6 [PH] (ref 5–8)
PROT UR STRIP-MCNC: NEGATIVE MG/DL
RBC # UR STRIP: NEGATIVE /UL
SP GR UR: 1.02 (ref 1–1.03)
URINE VOLUME: 0
UROBILINOGEN UR QL: NORMAL

## 2024-04-19 RX ORDER — TAMSULOSIN HYDROCHLORIDE 0.4 MG/1
1 CAPSULE ORAL DAILY
Qty: 90 CAPSULE | Refills: 3 | Status: SHIPPED | OUTPATIENT
Start: 2024-04-19

## 2024-04-19 NOTE — PROGRESS NOTES
Chief Complaint: Benign Prostatic Hypertrophy (Patient states that it is harder to urinate. He says that it takes longer) and Nocturia    Subjective         History of Present Illness  Rivera Ku is a 58 y.o. male presents to [facility] to be seen for elevated Psa .     Patient presents today for annual follow-up visit/ visit for elevated PSA.    Nocturia x 0-1     Takes a little longer to empty.     Some straining at times and hesitancy.     Patient denies any urinary frequency, dysuria.    Some urgency at times.      Denies any gross hematuria.    Lat psa 1/2023 4.49 PAS on repeat 3/19/24 5.19     Previous psa's:   1/23: 3.76  6/22: 3.58  4/22: 4.29    Objective     Past Medical History:   Diagnosis Date    Elevated cholesterol     GERD (gastroesophageal reflux disease)     Hyperlipidemia     Ocular migraine 01/31/2023       Past Surgical History:   Procedure Laterality Date    COLONOSCOPY      COLONOSCOPY N/A 1/18/2024    Procedure: COLONOSCOPY;  Surgeon: Paul Herrera MD;  Location: [facility] ENDOSCOPY;  Service: General;  Laterality: N/A;  NORMAL         Current Outpatient Medications:     aspirin 81 MG EC tablet, Take 1 tablet by mouth Daily., Disp: 30 tablet, Rfl: 5    nortriptyline (PAMELOR) 25 MG capsule, Take 1 capsule by mouth Every Night for 90 days., Disp: 90 capsule, Rfl: 1    pantoprazole (PROTONIX) 40 MG EC tablet, Take 1 tablet by mouth Daily., Disp: 90 tablet, Rfl: 1    tamsulosin (FLOMAX) 0.4 MG capsule 24 hr capsule, Take 1 capsule by mouth Daily., Disp: 90 capsule, Rfl: 3    No Known Allergies     Family History   Problem Relation Age of Onset    Lung cancer Father        Social History     Socioeconomic History    Marital status:    Tobacco Use    Smoking status: Former     Current packs/day: 0.25     Average packs/day: 0.3 packs/day for 2.0 years (0.5 ttl pk-yrs)     Types: Cigarettes     Passive exposure: Past    Smokeless tobacco: Former     Types:  "Chew    Tobacco comments:     30 yrs ago   Vaping Use    Vaping status: Never Used   Substance and Sexual Activity    Alcohol use: Never    Drug use: Never    Sexual activity: Defer       Vital Signs:   /84 (BP Location: Right arm, Patient Position: Sitting, Cuff Size: Large Adult)   Pulse 96   Ht 154.9 cm (61\")   Wt 63.5 kg (140 lb)   BMI 26.45 kg/m²      Physical Exam     Result Review :   The following data was reviewed by: ROSE MARY Guerra on 04/19/2024:  Results for orders placed or performed in visit on 04/19/24   Bladder Scan   Result Value Ref Range    Urine Volume 0    POC Urinalysis Dipstick, Automated    Specimen: Urine   Result Value Ref Range    Color Yellow Yellow, Straw, Dark Yellow, Christina    Clarity, UA Clear Clear    Specific Gravity  1.025 1.005 - 1.030    pH, Urine 6.0 5.0 - 8.0    Leukocytes Negative Negative    Nitrite, UA Negative Negative    Protein, POC Negative Negative mg/dL    Glucose, UA Negative Negative mg/dL    Ketones, UA Negative Negative    Urobilinogen, UA 0.2 E.U./dL Normal, 0.2 E.U./dL    Bilirubin Negative Negative    Blood, UA Negative Negative    Lot Number [id number]     Expiration Date 2/25       PSA          3/12/2024    08:26   PSA   PSA 5.190        Bladder Scan interpretation 04/19/2024    Estimation of residual urine via BVI 3000 Verathon Bladder Scan  MA/nurse performing: ras coffman Ma   Residual Urine: 0 ml  Indication: Benign prostatic hyperplasia with weak urinary stream    Elevated prostate specific antigen (PSA)   Position: Supine  Examination: Incremental scanning of the suprapubic area using 2.0 MHz transducer using copious amounts of acoustic gel.   Findings: An anechoic area was demonstrated which represented the bladder, with measurement of residual urine as noted. I inspected this myself. In that the residual urine was stable or insignificant, refer to plan for treatment and plan necessary at this time.            Procedures        Assessment " and Plan    Diagnoses and all orders for this visit:    1. Benign prostatic hyperplasia with weak urinary stream (Primary)  -     Bladder Scan  -     POC Urinalysis Dipstick, Automated  -     tamsulosin (FLOMAX) 0.4 MG capsule 24 hr capsule; Take 1 capsule by mouth Daily.  Dispense: 90 capsule; Refill: 3    2. Elevated prostate specific antigen (PSA)  -     MRI Prostate With & Without Contrast; Future        Given increase in PSA will go ahead and proceed with MRI of the prostate to delineate underlying etiology of elevated PSA and potentially provide mapping for fusion biopsy.    Patient is interested in a trial of tamsulosin for lower urinary tract symptoms.  This will be sent today.    I spent 10 minutes caring for Rivera on this date of service. This time includes time spent by me in the following activities:reviewing tests, obtaining and/or reviewing a separately obtained history, performing a medically appropriate examination and/or evaluation , counseling and educating the patient/family/caregiver, ordering medications, tests, or procedures, and documenting information in the medical record  Follow Up   Return for After MRI.  Patient was given instructions and counseling regarding his condition or for health maintenance advice. Please see specific information pulled into the AVS if appropriate.         This document has been electronically signed by ROSE MARY Guerra  April 19, 2024 10:51 EDT

## 2024-05-07 ENCOUNTER — PREP FOR SURGERY (OUTPATIENT)
Dept: OTHER | Facility: HOSPITAL | Age: 59
End: 2024-05-07
Payer: COMMERCIAL

## 2024-05-07 DIAGNOSIS — R97.20 ELEVATED PROSTATE SPECIFIC ANTIGEN (PSA): Primary | ICD-10-CM

## 2024-05-07 RX ORDER — CIPROFLOXACIN 500 MG/1
TABLET, FILM COATED ORAL
Qty: 6 TABLET | Refills: 0 | Status: SHIPPED | OUTPATIENT
Start: 2024-05-07

## 2024-05-07 RX ORDER — SODIUM CHLORIDE 0.9 % (FLUSH) 0.9 %
3 SYRINGE (ML) INJECTION EVERY 12 HOURS SCHEDULED
OUTPATIENT
Start: 2024-05-07

## 2024-05-07 RX ORDER — SODIUM CHLORIDE 0.9 % (FLUSH) 0.9 %
10 SYRINGE (ML) INJECTION AS NEEDED
OUTPATIENT
Start: 2024-05-07

## 2024-05-07 RX ORDER — SODIUM CHLORIDE 9 MG/ML
100 INJECTION, SOLUTION INTRAVENOUS CONTINUOUS
OUTPATIENT
Start: 2024-05-07

## 2024-05-07 RX ORDER — SODIUM CHLORIDE 9 MG/ML
40 INJECTION, SOLUTION INTRAVENOUS AS NEEDED
OUTPATIENT
Start: 2024-05-07

## 2024-05-13 NOTE — PRE-PROCEDURE INSTRUCTIONS
IMPORTANT INSTRUCTIONS - PRE-ADMISSION TESTING  DO NOT EAT OR CHEW anything after midnight the night before your procedure.    You may have CLEAR liquids up to __2__ hours prior to ARRIVAL time.   Take the following medications the morning of your procedure with JUST A SIP OF WATER:  PROTONIX, CIPRO,FLOMAX  _______________________________________________________________________________________________________________________________________________________________________________________    DO NOT BRING your medications to the hospital with you, UNLESS something has changed since your PRE-Admission Testing appointment.  Hold all vitamins, supplements, and NSAIDS (Non- steroidal anti-inflammatory meds) for one week prior to surgery (you MAY take Tylenol or Acetaminophen).  If you are diabetic, check your blood sugar the morning of your procedure. If it is less than 70 or if you are feeling symptomatic, call the following number for further instructions: [phone]_.  Use your inhalers/nebulizers as usual, the morning of your procedure. BRING YOUR INHALERS with you.   Bring your CPAP or BIPAP to hospital, ONLY IF YOU WILL BE SPENDING THE NIGHT.   Make sure you have a ride home and have someone who will stay with you the day of your procedure after you go home.  If you have any questions, please call your Pre-Admission Testing Nurse, ___BABAR___ at [phone]_____.   Per anesthesia request, do not smoke for 24 hours before your procedure or as instructed by your surgeon.    Clear Liquid Diet        Find out when you need to start a clear liquid diet.   Think of “clear liquids” as anything you could read a newspaper through. This includes things like water, broth, sports drinks, or tea WITHOUT any kind of milk or cream.           Once you are told to start a clear liquid diet, only drink these things until 2 hours before arrival to the hospital or when the hospital says to stop. Total volume limitation: 8 oz.        Clear liquids you CAN drink:   Water   Clear broth: beef, chicken, vegetable, or bone broth with nothing in it   Gatorade   Lemonade or Gomez-aid   Soda   Tea, coffee (NO cream or honey)   Jell-O (without fruit)   Popsicles (without fruit or cream)   Italian ices   Juice without pulp: apple, white, grape   You may use salt, pepper, and sugar    Do NOT drink:   Milk or cream   Soy milk, almond milk, coconut milk, or other non-dairy drinks and   creamers   Milkshakes or smoothies   Tomato juice   Orange juice   Grapefruit juice   Cream soups or any other than broth         Clear Liquid Diet:  Do NOT eat any solid food.  Do NOT eat or suck on mints or candy.  Do NOT chew gum.  Do NOT drink thick liquids like milk or juice with pulp in it.  Do NOT add milk, cream, or anything like soy milk or almond milk to coffee or tea.

## 2024-05-16 ENCOUNTER — ANESTHESIA EVENT (OUTPATIENT)
Dept: PERIOP | Facility: HOSPITAL | Age: 59
End: 2024-05-16
Payer: COMMERCIAL

## 2024-05-16 ENCOUNTER — ANESTHESIA (OUTPATIENT)
Dept: PERIOP | Facility: HOSPITAL | Age: 59
End: 2024-05-16
Payer: COMMERCIAL

## 2024-05-16 ENCOUNTER — HOSPITAL ENCOUNTER (OUTPATIENT)
Facility: HOSPITAL | Age: 59
Setting detail: HOSPITAL OUTPATIENT SURGERY
Discharge: HOME | End: 2024-05-16
Attending: UROLOGY | Admitting: UROLOGY
Payer: COMMERCIAL

## 2024-05-16 VITALS
OXYGEN SATURATION: 99 % | RESPIRATION RATE: 16 BRPM | HEIGHT: 62 IN | SYSTOLIC BLOOD PRESSURE: 131 MMHG | WEIGHT: 132.28 LBS | HEART RATE: 75 BPM | BODY MASS INDEX: 24.34 KG/M2 | TEMPERATURE: 96.9 F | DIASTOLIC BLOOD PRESSURE: 86 MMHG

## 2024-05-16 DIAGNOSIS — R97.20 ELEVATED PROSTATE SPECIFIC ANTIGEN (PSA): ICD-10-CM

## 2024-05-16 PROCEDURE — 55700 PR PROSTATE NEEDLE BIOPSY ANY APPROACH: CPT | Performed by: UROLOGY

## 2024-05-16 PROCEDURE — 25010000002 CEFOXITIN PER 1 G: Performed by: NURSE PRACTITIONER

## 2024-05-16 PROCEDURE — 25010000002 MIDAZOLAM PER 1MG: Performed by: ANESTHESIOLOGY

## 2024-05-16 PROCEDURE — S0260 H&P FOR SURGERY: HCPCS | Performed by: UROLOGY

## 2024-05-16 PROCEDURE — 88305 TISSUE EXAM BY PATHOLOGIST: CPT | Performed by: UROLOGY

## 2024-05-16 PROCEDURE — 76942 ECHO GUIDE FOR BIOPSY: CPT | Performed by: UROLOGY

## 2024-05-16 PROCEDURE — 25010000002 PROPOFOL 10 MG/ML EMULSION: Performed by: NURSE ANESTHETIST, CERTIFIED REGISTERED

## 2024-05-16 PROCEDURE — 25810000003 LACTATED RINGERS PER 1000 ML: Performed by: ANESTHESIOLOGY

## 2024-05-16 RX ORDER — OXYCODONE HYDROCHLORIDE 5 MG/1
5 TABLET ORAL
Status: DISCONTINUED | OUTPATIENT
Start: 2024-05-16 | End: 2024-05-16 | Stop reason: HOSPADM

## 2024-05-16 RX ORDER — ONDANSETRON 2 MG/ML
4 INJECTION INTRAMUSCULAR; INTRAVENOUS ONCE AS NEEDED
Status: DISCONTINUED | OUTPATIENT
Start: 2024-05-16 | End: 2024-05-16 | Stop reason: HOSPADM

## 2024-05-16 RX ORDER — MIDAZOLAM HYDROCHLORIDE 2 MG/2ML
2 INJECTION, SOLUTION INTRAMUSCULAR; INTRAVENOUS ONCE
Status: COMPLETED | OUTPATIENT
Start: 2024-05-16 | End: 2024-05-16

## 2024-05-16 RX ORDER — ACETAMINOPHEN 500 MG
1000 TABLET ORAL ONCE
Status: COMPLETED | OUTPATIENT
Start: 2024-05-16 | End: 2024-05-16

## 2024-05-16 RX ORDER — SODIUM CHLORIDE 0.9 % (FLUSH) 0.9 %
3 SYRINGE (ML) INJECTION EVERY 12 HOURS SCHEDULED
Status: DISCONTINUED | OUTPATIENT
Start: 2024-05-16 | End: 2024-05-16 | Stop reason: HOSPADM

## 2024-05-16 RX ORDER — IBUPROFEN 600 MG/1
600 TABLET ORAL EVERY 6 HOURS PRN
Status: DISCONTINUED | OUTPATIENT
Start: 2024-05-16 | End: 2024-05-16 | Stop reason: HOSPADM

## 2024-05-16 RX ORDER — SODIUM CHLORIDE 9 MG/ML
100 INJECTION, SOLUTION INTRAVENOUS CONTINUOUS
Status: DISCONTINUED | OUTPATIENT
Start: 2024-05-16 | End: 2024-05-16 | Stop reason: HOSPADM

## 2024-05-16 RX ORDER — ACETAMINOPHEN 325 MG/1
650 TABLET ORAL ONCE
Status: DISCONTINUED | OUTPATIENT
Start: 2024-05-16 | End: 2024-05-16 | Stop reason: HOSPADM

## 2024-05-16 RX ORDER — PROMETHAZINE HYDROCHLORIDE 12.5 MG/1
25 TABLET ORAL ONCE AS NEEDED
Status: DISCONTINUED | OUTPATIENT
Start: 2024-05-16 | End: 2024-05-16 | Stop reason: HOSPADM

## 2024-05-16 RX ORDER — SODIUM CHLORIDE 9 MG/ML
40 INJECTION, SOLUTION INTRAVENOUS AS NEEDED
Status: DISCONTINUED | OUTPATIENT
Start: 2024-05-16 | End: 2024-05-16 | Stop reason: HOSPADM

## 2024-05-16 RX ORDER — SODIUM CHLORIDE, SODIUM LACTATE, POTASSIUM CHLORIDE, CALCIUM CHLORIDE 600; 310; 30; 20 MG/100ML; MG/100ML; MG/100ML; MG/100ML
9 INJECTION, SOLUTION INTRAVENOUS CONTINUOUS PRN
Status: DISCONTINUED | OUTPATIENT
Start: 2024-05-16 | End: 2024-05-16 | Stop reason: HOSPADM

## 2024-05-16 RX ORDER — PROMETHAZINE HYDROCHLORIDE 25 MG/1
25 SUPPOSITORY RECTAL ONCE AS NEEDED
Status: DISCONTINUED | OUTPATIENT
Start: 2024-05-16 | End: 2024-05-16 | Stop reason: HOSPADM

## 2024-05-16 RX ORDER — MEPERIDINE HYDROCHLORIDE 25 MG/ML
12.5 INJECTION INTRAMUSCULAR; INTRAVENOUS; SUBCUTANEOUS
Status: DISCONTINUED | OUTPATIENT
Start: 2024-05-16 | End: 2024-05-16 | Stop reason: HOSPADM

## 2024-05-16 RX ORDER — SODIUM CHLORIDE 0.9 % (FLUSH) 0.9 %
10 SYRINGE (ML) INJECTION AS NEEDED
Status: DISCONTINUED | OUTPATIENT
Start: 2024-05-16 | End: 2024-05-16 | Stop reason: HOSPADM

## 2024-05-16 RX ORDER — LIDOCAINE HYDROCHLORIDE 20 MG/ML
INJECTION, SOLUTION EPIDURAL; INFILTRATION; INTRACAUDAL; PERINEURAL AS NEEDED
Status: DISCONTINUED | OUTPATIENT
Start: 2024-05-16 | End: 2024-05-16 | Stop reason: SURG

## 2024-05-16 RX ORDER — PROPOFOL 10 MG/ML
VIAL (ML) INTRAVENOUS CONTINUOUS PRN
Status: DISCONTINUED | OUTPATIENT
Start: 2024-05-16 | End: 2024-05-16 | Stop reason: SURG

## 2024-05-16 RX ORDER — PROMETHAZINE HYDROCHLORIDE 12.5 MG/1
12.5 TABLET ORAL ONCE AS NEEDED
Status: DISCONTINUED | OUTPATIENT
Start: 2024-05-16 | End: 2024-05-16 | Stop reason: HOSPADM

## 2024-05-16 RX ADMIN — ACETAMINOPHEN 1000 MG: 500 TABLET ORAL at 12:20

## 2024-05-16 RX ADMIN — LIDOCAINE HYDROCHLORIDE 80 MG: 20 INJECTION, SOLUTION INTRAVENOUS at 13:37

## 2024-05-16 RX ADMIN — PROPOFOL 200 MCG/KG/MIN: 10 INJECTION, EMULSION INTRAVENOUS at 13:36

## 2024-05-16 RX ADMIN — MIDAZOLAM HYDROCHLORIDE 2 MG: 1 INJECTION, SOLUTION INTRAMUSCULAR; INTRAVENOUS at 13:15

## 2024-05-16 RX ADMIN — SODIUM CHLORIDE, POTASSIUM CHLORIDE, SODIUM LACTATE AND CALCIUM CHLORIDE 9 ML/HR: 600; 310; 30; 20 INJECTION, SOLUTION INTRAVENOUS at 12:20

## 2024-05-16 RX ADMIN — CEFOXITIN 2 G: 2 INJECTION, POWDER, FOR SOLUTION INTRAVENOUS at 13:36

## 2024-05-16 NOTE — H&P
[facility]   Urology HISTORY AND PHYSICAL    Patient Name: Rivera Ku  : 1965  MRN: [id number]  Primary Care Physician:  Kayce Tolentino APRN  Date of admission: 2024    Subjective   Subjective       History of Present Illness  Patient has an elevated PSA lesion the prostate and presents for MRI fusion transrectal ultrasound and biopsy of the prostate.      Personal History     Past Medical History:   Diagnosis Date    Elevated cholesterol     Elevated PSA     GERD (gastroesophageal reflux disease)     Hyperlipidemia     Ocular migraine 2023       Past Surgical History:   Procedure Laterality Date    COLONOSCOPY      COLONOSCOPY N/A 2024    Procedure: COLONOSCOPY;  Surgeon: Paul Herrera MD;  Location: [facility] ENDOSCOPY;  Service: General;  Laterality: N/A;  NORMAL       Family History: family history includes Lung cancer in his father. Otherwise pertinent FHx was reviewed and not pertinent to current issue.    Social History:  reports that he has quit smoking. His smoking use included cigarettes. He has a 0.5 pack-year smoking history. He has been exposed to tobacco smoke. He has quit using smokeless tobacco.  His smokeless tobacco use included chew. He reports that he does not drink alcohol and does not use drugs.    Home Medications:  aspirin, ciprofloxacin, nortriptyline, pantoprazole, and tamsulosin    Allergies:  No Known Allergies    Objective    Objective     Vitals:   Temp:  [97.6 °F (36.4 °C)] 97.6 °F (36.4 °C)  Heart Rate:  [93] 93  Resp:  [18] 18  BP: (144)/(75) 144/75    Physical Exam    Result Review    Result Review:  I have personally reviewed the results from the time of this admission to 2024 13:15 EDT and agree with these findings:  []  Laboratory  []  Microbiology  []  Radiology  []  EKG/Telemetry   []  Cardiology/Vascular   []  Pathology  []  Old records  []  Other:      Assessment & Plan   Assessment / Plan       Active Hospital Problems:  Active  Hospital Problems    Diagnosis     **Elevated prostate specific antigen (PSA)        Plan: MRI fusion transrectal ultrasound and biopsy of the prostate  Risks and benefits discussed with patient and they are agreeable to proceed.    DVT prophylaxis:  Mechanical DVT prophylaxis orders are present.        CODE STATUS:           Electronically signed by Ashley Sosa MD, 05/16/24, 1:15 PM EDT.

## 2024-05-16 NOTE — ANESTHESIA PREPROCEDURE EVALUATION
Anesthesia Evaluation     Patient summary reviewed and Nursing notes reviewed   no history of anesthetic complications:   NPO Solid Status: > 8 hours  NPO Liquid Status: > 2 hours           Airway   Mallampati: II  TM distance: >3 FB  Neck ROM: full  No difficulty expected  Dental      Pulmonary - negative pulmonary ROS and normal exam    breath sounds clear to auscultation  Cardiovascular - normal exam  Exercise tolerance: good (4-7 METS)    Rhythm: regular  Rate: normal    (+) hyperlipidemia      Neuro/Psych  (+) headaches  GI/Hepatic/Renal/Endo    (+) GERD    Musculoskeletal (-) negative ROS    Abdominal    Substance History - negative use     OB/GYN negative ob/gyn ROS         Other - negative ROS       ROS/Med Hx Other: PAT Nursing Notes unavailable.               Anesthesia Plan    ASA 2     general     (Patient understands anesthesia not responsible for dental damage.)  intravenous induction     Anesthetic plan, risks, benefits, and alternatives have been provided, discussed and informed consent has been obtained with: patient.    Use of blood products discussed with patient .    Plan discussed with CRNA.    CODE STATUS:

## 2024-05-16 NOTE — DISCHARGE INSTRUCTIONS
Discharge Instructions Prostate Biopsy       For your surgery you had:  General anesthesia (you may have a sore throat for the first 24 hours)  Monitored anesthesia care  You may experience dizziness, drowsiness, or lightheadedness for several hours following surgery.  Do not stay alone today or tonight.  Limit your activity for 24 hours.  You should not drive, operate machinery, drink alcohol, or sign legally binding documents for 24 hours or while you are taking pain medication.  Resume your diet slowly.  Follow any special dietary instructions you may have been given by your doctor.      NOTIFY YOUR DOCTOR IF YOU EXPERIENCE ANY OF THE FOLLOWING:  Temperature greater than 101 degrees Fahrenheit  Shaking Chills  Redness or excessive drainage from incision  Nausea, vomiting and/or pain that is not controlled by prescribed medications  Increase in bleeding or bleeding that is excessive  Unable to urinate in 6 hours after surgery  If unable to reach your doctor, please go to the closest Emergency Room.   Drink 4-6 glasses of water a day for 3-4 days  You may see blood in your urine for up to a week, blood in your stool for 3-4 days, and blood in semen for up to a month  If you are passing large clots, call your doctor  Avoid lifting or straining for 48 hours  It is normal to experience burning during urination for 48 hours after biopsy  Call your doctor if pain, burning, urgency, or frequency of urination persist beyond 48 hours  Medications per physician as indicated on After Visit Summary Sheet      Last dose of pain medication given at:   Tylenol 1000mg at 12:20pm, Can take next dose at 4:40pm.    SPECIAL INSTRUCTIONS:  *Continue home medications as instructed by Physician.

## 2024-05-16 NOTE — OP NOTE
PROSTATE ULTRASOUND BIOPSY MRI FUSION WITH URONAV  Procedure Report    Patient Name:  Rivera Ku  YOB: 1965    Date of Surgery:  5/16/2024     Pre-op Diagnosis:   Elevated prostate specific antigen (PSA) [R97.20]       Post-Op Diagnosis Codes:     * Elevated prostate specific antigen (PSA) [R97.20]      Procedure/CPT® Codes:    Procedure(s):  MAGNETIC RESONANCE IMAGING FUSION TRANSRECTAL ULTRASOUND AND BIOPSY OF THE PROSTATE      Staff:  Surgeon(s):  Ashley Sosa MD         Anesthesia: Sedation    Estimated Blood Loss: 2 mL    Implants:    Nothing was implanted during the procedure    Specimen:          Specimens       ID Source Type Tests Collected By Collected At Frozen?    A Prostate Tissue TISSUE PATHOLOGY EXAM   Ashley Sosa MD 5/16/24 1321     Description: RIGHT SIDE x 6    Comment: Prostate Biopsy    B Prostate Tissue TISSUE PATHOLOGY EXAM   Ashley Sosa MD 5/16/24 1321     Description: LEFT SIDE x 6    Comment: Prostate Biopsy    C Prostate Tissue TISSUE PATHOLOGY EXAM   Ashley Sosa MD 5/16/24 1321     Description: MELISSA#1 LEFT LATERAL x 4    Comment: Prostate Biopsy                Complications: None    Description of Procedure:     An ultrasound probe was placed into the rectum and the prostate was inspected.  No obvious lesions were seen.    The prostate was then imaged and examined using the ultrasound and this was fused with his previous MRI of his prostate using the fusion software.      The right and left sides of the prostate were then biopsied randomly using the biopsy needle.  They were labeled as right and left prostate biopsy.  6 were taken on the left and 6 on the right.     At this point, the biopsies were taken of the region of interest from the prostate.  These were sent to pathology and labeled as region of interest.      The patient tolerated this well.  The ultrasound probe was removed.  He was transferred to the PACU in stable condition.          Ashley Sosa MD     Date: 5/16/2024  Time: 14:05 EDT

## 2024-05-16 NOTE — ANESTHESIA POSTPROCEDURE EVALUATION
Patient: Rivera Ku    Procedure Summary       Date: 05/16/24 Room / Location: [facility] OR 01 / [facility] MAIN OR    Anesthesia Start: 1336 Anesthesia Stop: 1352    Procedure: MAGNETIC RESONANCE IMAGING FUSION TRANSRECTAL ULTRASOUND AND BIOPSY OF THE PROSTATE (Anus) Diagnosis:       Elevated prostate specific antigen (PSA)      (Elevated prostate specific antigen (PSA) [R97.20])    Surgeons: Ashley Sosa MD Provider: Ermias Yu MD    Anesthesia Type: general ASA Status: 2            Anesthesia Type: general    Vitals  No vitals data found for the desired time range.          Post Anesthesia Care and Evaluation    Patient location during evaluation: bedside  Patient participation: complete - patient participated  Level of consciousness: awake and alert  Pain management: adequate    Airway patency: patent  Anesthetic complications: No anesthetic complications  PONV Status: none  Cardiovascular status: acceptable  Respiratory status: acceptable  Hydration status: acceptable    Comments: An Anesthesiologist personally participated in the most demanding procedures (including induction and emergence if applicable) in the anesthesia plan, monitored the course of anesthesia administration at frequent intervals and remained physically present and available for immediate diagnosis and treatment of emergencies.

## 2024-05-20 ENCOUNTER — TELEPHONE (OUTPATIENT)
Dept: UROLOGY | Facility: CLINIC | Age: 59
End: 2024-05-20
Payer: COMMERCIAL

## 2024-05-20 LAB
CYTO UR: NORMAL
LAB AP CASE REPORT: NORMAL
LAB AP CLINICAL INFORMATION: NORMAL
PATH REPORT.FINAL DX SPEC: NORMAL
PATH REPORT.GROSS SPEC: NORMAL

## 2024-05-22 ENCOUNTER — OFFICE VISIT (OUTPATIENT)
Dept: UROLOGY | Facility: CLINIC | Age: 59
End: 2024-05-22
Payer: COMMERCIAL

## 2024-05-22 VITALS
HEIGHT: 62 IN | BODY MASS INDEX: 24.29 KG/M2 | SYSTOLIC BLOOD PRESSURE: 135 MMHG | WEIGHT: 132 LBS | DIASTOLIC BLOOD PRESSURE: 71 MMHG

## 2024-05-22 DIAGNOSIS — C61 PROSTATE CANCER: Primary | ICD-10-CM

## 2024-05-22 LAB
BILIRUB BLD-MCNC: NEGATIVE MG/DL
CLARITY, POC: CLEAR
COLOR UR: YELLOW
EXPIRATION DATE: ABNORMAL
GLUCOSE UR STRIP-MCNC: NEGATIVE MG/DL
KETONES UR QL: NEGATIVE
LEUKOCYTE EST, POC: ABNORMAL
Lab: ABNORMAL
NITRITE UR-MCNC: NEGATIVE MG/ML
PH UR: 7 [PH] (ref 5–8)
PROT UR STRIP-MCNC: NEGATIVE MG/DL
RBC # UR STRIP: ABNORMAL /UL
SP GR UR: 1.01 (ref 1–1.03)
UROBILINOGEN UR QL: ABNORMAL

## 2024-05-22 NOTE — PROGRESS NOTES
"Chief Complaint  Elevated PSA    Subjective          Rivera Ku presents to [facility] UROLOGY  History of Present Illness  Mr. Ku is here for follow-up for prostate cancer recently diagnosed on prostate biopsy.  He had a Seble 6 prostate cancer in both the right and left side, small amounts.  He did have a region of interest from his MRI that was negative.  He has no complaints status post biopsy.      Objective   Vital Signs:   /71   Ht 157.5 cm (62\")   Wt 59.9 kg (132 lb)   BMI 24.14 kg/m²       Physical Exam  Vitals and nursing note reviewed.   Constitutional:       Appearance: Normal appearance. He is well-developed.   Pulmonary:      Effort: Pulmonary effort is normal.      Breath sounds: Normal air entry.   Neurological:      Mental Status: He is alert and oriented to person, place, and time.      Motor: Motor function is intact.   Psychiatric:         Mood and Affect: Mood normal.         Behavior: Behavior normal.          Result Review :   The following data was reviewed by: Ashley Sosa MD on 05/22/2024:    Results for orders placed or performed in visit on 05/22/24   POC Urinalysis Dipstick, Automated    Specimen: Urine   Result Value Ref Range    Color Yellow Yellow, Straw, Dark Yellow, Christina    Clarity, UA Clear Clear    Specific Gravity  1.010 1.005 - 1.030    pH, Urine 7.0 5.0 - 8.0    Leukocytes Trace (A) Negative    Nitrite, UA Negative Negative    Protein, POC Negative Negative mg/dL    Glucose, UA Negative Negative mg/dL    Ketones, UA Negative Negative    Urobilinogen, UA 0.2 E.U./dL Normal, 0.2 E.U./dL    Bilirubin Negative Negative    Blood, UA Large (A) Negative    Lot Number [id number]     Expiration Date 32,025        PSA          3/12/2024    08:26   PSA   PSA 5.190               Assessment and Plan    Diagnoses and all orders for this visit:    1. Prostate cancer (Primary)  -     POC Urinalysis Dipstick, Automated  -     PSA DIAGNOSTIC; " Future    Patient will follow back up in 6 months with a PSA prior.  We discussed treatment options but given his low-grade low-volume prostate cancer we will proceed with active surveillance.  He will have a repeat prostate biopsy and MRI in 1 year unless PSA indicates need for 1 sooner than that.  Follow back up in 6 months with PSA.        Follow Up       No follow-ups on file.  Patient was given instructions and counseling regarding his condition or for health maintenance advice. Please see specific information pulled into the AVS if appropriate.

## 2024-07-03 ENCOUNTER — OFFICE VISIT (OUTPATIENT)
Dept: NEUROLOGY | Facility: CLINIC | Age: 59
End: 2024-07-03
Payer: COMMERCIAL

## 2024-07-03 VITALS
WEIGHT: 134.2 LBS | SYSTOLIC BLOOD PRESSURE: 117 MMHG | DIASTOLIC BLOOD PRESSURE: 66 MMHG | BODY MASS INDEX: 24.69 KG/M2 | HEART RATE: 83 BPM | HEIGHT: 62 IN

## 2024-07-03 DIAGNOSIS — G43.109 COMPLICATED MIGRAINE: Primary | ICD-10-CM

## 2024-07-03 DIAGNOSIS — R53.1 SPELL OF GENERALIZED WEAKNESS: ICD-10-CM

## 2024-07-03 RX ORDER — NORTRIPTYLINE HYDROCHLORIDE 25 MG/1
25 CAPSULE ORAL NIGHTLY
Qty: 90 CAPSULE | Refills: 1 | Status: SHIPPED | OUTPATIENT
Start: 2024-07-03 | End: 2024-10-01

## 2024-07-03 NOTE — PROGRESS NOTES
"Chief Complaint  Migraine    Subjective          Rivera Ku presents to [facility]  History of Present Illness  Following up for complex migraine. Has only had 1 episode of vision loss since last visit. Remains on nortriptyline.  Denies side effects. Continuing to have very intermittent shortlived spells of generalized weakness.  Often will sit down or eat/drink and spell will pass.     Interval History:   States over 20 years ago he had an episode of decreased vision.  About once a year he would have visual disturbance that lasted about 5 minutes, sometimes followed by a headache.  Since March 2022, he states the vision issues became more of an issue.  Since October has had visual issues about twice. However, states he feels generally ill. Has had twiching under left eye.  Feels paresthesia to scalp and sides of face.  Occasionally mildly lightheaded. Occasional nausea.  Some fatigue.  Sometimes has a burning sensation in left shoulder.  A couple of times his tongue has felt like sandpaper, dry mouth.        Objective   Vital Signs:   /66   Pulse 83   Ht 157.5 cm (62\")   Wt 60.9 kg (134 lb 3.2 oz)   BMI 24.55 kg/m²     Physical Exam  HENT:      Head: Normocephalic.   Pulmonary:      Effort: Pulmonary effort is normal.   Neurological:      Mental Status: He is alert and oriented to person, place, and time.      Sensory: Sensation is intact.      Motor: Motor function is intact.      Coordination: Coordination is intact.      Deep Tendon Reflexes: Reflexes are normal and symmetric.        Neurologic Exam     Mental Status   Oriented to person, place, and time.        Result Review :   CBC:  Lab Results   Component Value Date    WBC 7.07 03/12/2024    RBC 4.98 03/12/2024    HGB 14.8 03/12/2024    HCT 44.2 03/12/2024    MCV 88.8 03/12/2024    MCH 29.7 03/12/2024    MCHC 33.5 03/12/2024    RDW 13.1 03/12/2024     03/12/2024     CMP:  Lab Results   Component " Value Date    BUN 17 01/24/2024    CREATININE 1.30 (H) 01/24/2024     01/24/2024    K 4.1 01/24/2024     01/24/2024    CALCIUM 9.1 01/24/2024    ALBUMIN 4.3 01/24/2024    BILITOT 0.6 01/24/2024    ALKPHOS 99 01/24/2024    AST 24 01/24/2024    ALT 58 (H) 01/24/2024     LIPID PANEL:  Lab Results   Component Value Date    TRIG 166 (H) 01/24/2024    HDL 50 01/24/2024    VLDL 30 01/24/2024     (H) 01/24/2024    LDLHDL 3.40 01/24/2024     B12:   Lab Results   Component Value Date    TKVXQNFQ64 823 01/31/2023      FOLATE:   Lab Results   Component Value Date    FOLATE 15.70 01/31/2023               Assessment and Plan    Diagnoses and all orders for this visit:    1. Complicated migraine (Primary)  Assessment & Plan:  Concern for complicated migraine.  Continue nortriptyline for preventative therapy.      2. Spell of generalized weakness  Assessment & Plan:  Concern for spells of hypotension leading to symptoms.  He will keep an eye on BP during those spells.       Other orders  -     nortriptyline (PAMELOR) 25 MG capsule; Take 1 capsule by mouth Every Night for 90 days.  Dispense: 90 capsule; Refill: 1        Follow Up   Return in about 6 months (around 1/3/2025) for Migraine f/u.  Patient was given instructions and counseling regarding his condition or for health maintenance advice. Please see specific information pulled into the AVS if appropriate.

## 2024-07-03 NOTE — ASSESSMENT & PLAN NOTE
Concern for spells of hypotension leading to symptoms.  He will keep an eye on BP during those spells.

## 2024-08-12 ENCOUNTER — TELEPHONE (OUTPATIENT)
Dept: FAMILY MEDICINE CLINIC | Facility: CLINIC | Age: 59
End: 2024-08-12

## 2024-08-12 NOTE — TELEPHONE ENCOUNTER
Fine by me, since I am the only male at this practice.      This document has been electronically signed by Wesley Celaya MD on August 12, 2024 10:17 EDT

## 2024-08-12 NOTE — TELEPHONE ENCOUNTER
Caller: Rivera Ku    Relationship: Self    Best call back number: [phone]    Who is your current provider: MALORIE VIGIL     Is your current provider offboarding? NO     Who would you like your new provider to be: SLIME PHILLIPS    What are your reasons for transferring care: WOULD JUST LIKE TO HAVE A MALE PCP     Additional notes:  HE WOULD LIKE TO CHANGE THE APPOINTMENT ON 8/26 FROM ANJU VIGIL TO SLIME PHILLIPS IF POSSIBLE.  PLEASE ADVISE PATIENT

## 2024-08-26 ENCOUNTER — OFFICE VISIT (OUTPATIENT)
Dept: FAMILY MEDICINE CLINIC | Facility: CLINIC | Age: 59
End: 2024-08-26
Payer: COMMERCIAL

## 2024-08-26 VITALS
SYSTOLIC BLOOD PRESSURE: 120 MMHG | HEART RATE: 93 BPM | WEIGHT: 137 LBS | DIASTOLIC BLOOD PRESSURE: 58 MMHG | OXYGEN SATURATION: 94 % | BODY MASS INDEX: 25.21 KG/M2 | HEIGHT: 62 IN

## 2024-08-26 DIAGNOSIS — Z23 NEED FOR SHINGLES VACCINE: ICD-10-CM

## 2024-08-26 DIAGNOSIS — R53.1 WEAKNESS: ICD-10-CM

## 2024-08-26 DIAGNOSIS — E78.5 HYPERLIPIDEMIA, UNSPECIFIED HYPERLIPIDEMIA TYPE: Primary | ICD-10-CM

## 2024-08-26 PROBLEM — Z12.11 SCREENING FOR MALIGNANT NEOPLASM OF COLON: Status: RESOLVED | Noted: 2023-08-24 | Resolved: 2024-08-26

## 2024-08-26 PROBLEM — R00.2 PALPITATIONS: Status: RESOLVED | Noted: 2022-12-07 | Resolved: 2024-08-26

## 2024-08-26 PROBLEM — R20.2 PARESTHESIA: Status: RESOLVED | Noted: 2023-01-31 | Resolved: 2024-08-26

## 2024-08-26 PROCEDURE — 90750 HZV VACC RECOMBINANT IM: CPT | Performed by: STUDENT IN AN ORGANIZED HEALTH CARE EDUCATION/TRAINING PROGRAM

## 2024-08-26 PROCEDURE — 90471 IMMUNIZATION ADMIN: CPT | Performed by: STUDENT IN AN ORGANIZED HEALTH CARE EDUCATION/TRAINING PROGRAM

## 2024-08-26 PROCEDURE — 99213 OFFICE O/P EST LOW 20 MIN: CPT | Performed by: STUDENT IN AN ORGANIZED HEALTH CARE EDUCATION/TRAINING PROGRAM

## 2024-08-26 RX ORDER — DIPHENHYDRAMINE HYDROCHLORIDE 25 MG/1
1 CAPSULE, LIQUID FILLED ORAL ONCE AS NEEDED
Qty: 1 EACH | Refills: 0 | Status: SHIPPED | OUTPATIENT
Start: 2024-08-26

## 2024-08-26 RX ORDER — ADHESIVE BANDAGE 3/4"
1 BANDAGE TOPICAL ONCE AS NEEDED
Qty: 1 EACH | Refills: 0 | Status: SHIPPED | OUTPATIENT
Start: 2024-08-26

## 2024-08-26 NOTE — PROGRESS NOTES
Subjective:       Rivera Ku is a 59 y.o. male with a concurrent medical history of  hyperlipidemia, ocular migraines, GERD, and BPH and prostate cancer who presents to establish care.      Rivera has a history of hyperlipidemia.  However, he has likely contraindication to statin as he developed severe weakness in his legs on Crestor in addition to other symptoms.    His total cholesterol and LDL cholesterol are very elevated.        He has attempted lifestyle changes and dietary modifications and is due for a new lipid panel.  Will obtain this today.    In most patients with elevated cholesterol who have side effects on a high intensity statin I will try moderate intensity first but due to the degree of side effects he experienced would likely use Zetia instead.    Will repeat fasting lipid panel.      Rivera has history of ocular or retinal migraines going back 2 years.  He is on nortriptyline 25 mg for this.  He may be experiencing some side effects on this medication as he reports intermittent episodes of weakness.  If our workup fails to identify cause, and might be worth discussing starting a calcium channel blocker such as nifedipine with neurology and trialing discontinuation of nortriptyline to see if intermittent weakness resolves.    Rivera has a history of gastroesophageal reflux disease.  Protonix 40 mg daily for this.      Rivera has been diagnosed with prostate cancer and is followed by Dr. Sosa for this.      Rivera has a new symptom today to report.  He has had intermittent episodic weakness going back quite some time now.  There is no predictive factor such as fasting and these episodes occurred at random intervals.  He has normal hemoglobin which would rule out anemia and normal TSH which would rule out hypothyroidism.        I will order a CMP today and magnesium level to reassess his electrolytes. I will also get him a blood pressure monitor so he can check his blood pressure during 1 of  these episodes and a blood sugar monitor as well so that he can check blood sugar to make sure that he is not having hypoglycemic or hypotensive episodes.    However, I do think that it could be a side effect of nortriptyline and it could be worth discussing with neurology whether to trial using a calcium channel blocker such as nifedipine instead.      The following portions of the patient's history were reviewed and updated as appropriate: allergies, current medications, past family history, past medical history, past social history, past surgical history, and problem list.    Past Medical Hx:  Past Medical History:   Diagnosis Date    Elevated cholesterol     Elevated PSA     GERD (gastroesophageal reflux disease)     Hyperlipidemia     Ocular migraine 01/31/2023       Past Surgical Hx:  Past Surgical History:   Procedure Laterality Date    COLONOSCOPY      COLONOSCOPY N/A 1/18/2024    Procedure: COLONOSCOPY;  Surgeon: Paul Herrera MD;  Location: [facility] ENDOSCOPY;  Service: General;  Laterality: N/A;  NORMAL    PROSTATE BIOPSY N/A 5/16/2024    Procedure: MAGNETIC RESONANCE IMAGING FUSION TRANSRECTAL ULTRASOUND AND BIOPSY OF THE PROSTATE;  Surgeon: Ashley Sosa MD;  Location: [facility] MAIN OR;  Service: Urology;  Laterality: N/A;       Current Meds:    Current Outpatient Medications:     aspirin 81 MG EC tablet, Take 1 tablet by mouth Daily. (Patient taking differently: Take 1 tablet by mouth Daily. LAST DOSE 5/14/24), Disp: 30 tablet, Rfl: 5    Blood Glucose Monitoring Suppl (Blood Glucose Monitor System) w/Device kit, Use 1 kit 1 (One) Time As Needed (When experiencing weakness) for up to 1 dose., Disp: 1 each, Rfl: 0    Blood Pressure Monitoring (Blood Pressure Cuff) misc, Use 1 kit 1 (One) Time As Needed (Check blood pressure during weeks pill) for up to 1 dose., Disp: 1 each, Rfl: 0    glucose blood test strip, Use with weak spell to look for hypoglycemia, Disp: 25 each, Rfl: 12    nortriptyline  "(PAMELOR) 25 MG capsule, Take 1 capsule by mouth Every Night for 90 days., Disp: 90 capsule, Rfl: 1    pantoprazole (PROTONIX) 40 MG EC tablet, Take 1 tablet by mouth Daily., Disp: 90 tablet, Rfl: 1    tamsulosin (FLOMAX) 0.4 MG capsule 24 hr capsule, Take 1 capsule by mouth Daily., Disp: 90 capsule, Rfl: 3    Allergies:  Allergies   Allergen Reactions    Crestor [Rosuvastatin] Other (See Comments) and Unknown - Low Severity     Leg weakness and brain fog       Family Hx:  Family History   Problem Relation Age of Onset    Lung cancer Father     Malig Hyperthermia Neg Hx         Social History:  Social History     Socioeconomic History    Marital status:    Tobacco Use    Smoking status: Former     Current packs/day: 0.25     Average packs/day: 0.3 packs/day for 2.0 years (0.5 ttl pk-yrs)     Types: Cigarettes     Passive exposure: Past    Smokeless tobacco: Former     Types: Chew    Tobacco comments:     30 yrs ago   Vaping Use    Vaping status: Never Used   Substance and Sexual Activity    Alcohol use: Never    Drug use: Never    Sexual activity: Defer       Review of Systems  Review of Systems   Neurological:  Positive for light-headedness.       Objective:     /58 (BP Location: Left arm, Patient Position: Sitting, Cuff Size: Adult)   Pulse 93   Ht 157.5 cm (62\")   Wt 62.1 kg (137 lb)   SpO2 94%   BMI 25.06 kg/m²   Physical Exam  Constitutional:       General: He is not in acute distress.     Appearance: Normal appearance. He is normal weight. He is not ill-appearing, toxic-appearing or diaphoretic.   Cardiovascular:      Rate and Rhythm: Normal rate.   Pulmonary:      Effort: Pulmonary effort is normal.      Breath sounds: Normal breath sounds.   Neurological:      Mental Status: He is alert.      Motor: No weakness.   Psychiatric:         Mood and Affect: Mood normal.         Behavior: Behavior normal.          Assessment/Plan:     Diagnoses and all orders for this visit:    1. Hyperlipidemia, " unspecified hyperlipidemia type (Primary)    Rivera has a history of hyperlipidemia.  However, he has likely contraindication to statin as he developed severe weakness in his legs on Crestor in addition to other symptoms.    His total cholesterol and LDL cholesterol are very elevated.        He has attempted lifestyle changes and dietary modifications and is due for a new lipid panel.  Will obtain this today.    In most patients with elevated cholesterol who have side effects on a high intensity statin I will try moderate intensity first but due to the degree of side effects he experienced would likely use Zetia instead.    Will repeat fasting lipid panel.    -     Lipid panel; Future    2. Weakness          Rivera has a new symptom today to report.  He has had intermittent episodic weakness going back quite some time now.  There is no predictive factor such as fasting and these episodes occurred at random intervals.  He has normal hemoglobin which would rule out anemia and normal TSH which would rule out hypothyroidism.        I will order a CMP today and magnesium level to reassess his electrolytes. I will also get him a blood pressure monitor so he can check his blood pressure during 1 of these episodes and a blood sugar monitor as well so that he can check blood sugar to make sure that he is not having hypoglycemic or hypotensive episodes.    However, I do think that it could be a side effect of nortriptyline and it could be worth discussing with neurology whether to trial using a calcium channel blocker such as nifedipine instead.    -     Comprehensive metabolic panel; Future  -     Magnesium; Future  -     glucose blood test strip; Use with weak spell to look for hypoglycemia  Dispense: 25 each; Refill: 12  -     Blood Glucose Monitoring Suppl (Blood Glucose Monitor System) w/Device kit; Use 1 kit 1 (One) Time As Needed (When experiencing weakness) for up to 1 dose.  Dispense: 1 each; Refill: 0  -     Blood  Pressure Monitoring (Blood Pressure Cuff) misc; Use 1 kit 1 (One) Time As Needed (Check blood pressure during weeks pill) for up to 1 dose.  Dispense: 1 each; Refill: 0          Rx changes: None today     Follow-up:     Return in about 6 months (around 2/26/2025) for Recheck.    Preventative:  Health Maintenance   Topic Date Due    INFLUENZA VACCINE  08/01/2024    COVID-19 Vaccine (3 - 2023-24 season) 08/27/2024 (Originally 9/1/2023)    ZOSTER VACCINE (1 of 2) 08/27/2024 (Originally 7/25/2013)    LIPID PANEL  01/24/2025    BMI FOLLOWUP  01/30/2025    ANNUAL PHYSICAL  08/26/2025    COLORECTAL CANCER SCREENING  01/18/2034    TDAP/TD VACCINES (2 - Td or Tdap) 01/30/2034    HEPATITIS C SCREENING  Completed    Pneumococcal Vaccine 0-64  Aged Out       This document has been electronically signed by Wesley Celaya MD on August 26, 2024 14:33 EDT       Parts of this note are electronic transcriptions/translations of spoken language to printed text using the Dragon Dictation system.

## 2024-08-27 ENCOUNTER — LAB (OUTPATIENT)
Dept: LAB | Facility: HOSPITAL | Age: 59
End: 2024-08-27
Payer: COMMERCIAL

## 2024-08-27 DIAGNOSIS — E78.5 HYPERLIPIDEMIA, UNSPECIFIED HYPERLIPIDEMIA TYPE: ICD-10-CM

## 2024-08-27 DIAGNOSIS — R74.8 ELEVATED ALKALINE PHOSPHATASE LEVEL: ICD-10-CM

## 2024-08-27 DIAGNOSIS — R74.8 ELEVATED ALKALINE PHOSPHATASE LEVEL: Primary | ICD-10-CM

## 2024-08-27 DIAGNOSIS — R53.1 WEAKNESS: ICD-10-CM

## 2024-08-27 LAB
ALBUMIN SERPL-MCNC: 4.1 G/DL (ref 3.5–5.2)
ALBUMIN/GLOB SERPL: 1.4 G/DL
ALP SERPL-CCNC: 126 U/L (ref 39–117)
ALT SERPL W P-5'-P-CCNC: 19 U/L (ref 1–41)
ANION GAP SERPL CALCULATED.3IONS-SCNC: 10 MMOL/L (ref 5–15)
AST SERPL-CCNC: 18 U/L (ref 1–40)
BILIRUB SERPL-MCNC: 0.7 MG/DL (ref 0–1.2)
BUN SERPL-MCNC: 11 MG/DL (ref 8–23)
BUN/CREAT SERPL: 9.2 (ref 7–25)
CALCIUM SPEC-SCNC: 9.1 MG/DL (ref 8.6–10.5)
CHLORIDE SERPL-SCNC: 103 MMOL/L (ref 98–107)
CHOLEST SERPL-MCNC: 228 MG/DL (ref 0–200)
CO2 SERPL-SCNC: 28 MMOL/L (ref 22–29)
CREAT SERPL-MCNC: 1.2 MG/DL (ref 0.76–1.27)
EGFRCR SERPLBLD CKD-EPI 2021: 68.8 ML/MIN/1.73
GGT SERPL-CCNC: 45 U/L (ref 8–61)
GLOBULIN UR ELPH-MCNC: 2.9 GM/DL
GLUCOSE SERPL-MCNC: 93 MG/DL (ref 65–99)
HDLC SERPL-MCNC: 51 MG/DL (ref 40–60)
LDLC SERPL CALC-MCNC: 163 MG/DL (ref 0–100)
LDLC/HDLC SERPL: 3.15 {RATIO}
MAGNESIUM SERPL-MCNC: 1.9 MG/DL (ref 1.6–2.4)
POTASSIUM SERPL-SCNC: 4.2 MMOL/L (ref 3.5–5.2)
PROT SERPL-MCNC: 7 G/DL (ref 6–8.5)
SODIUM SERPL-SCNC: 141 MMOL/L (ref 136–145)
TRIGL SERPL-MCNC: 82 MG/DL (ref 0–150)
VLDLC SERPL-MCNC: 14 MG/DL (ref 5–40)

## 2024-08-27 PROCEDURE — 82977 ASSAY OF GGT: CPT

## 2024-08-27 PROCEDURE — 80061 LIPID PANEL: CPT

## 2024-08-27 PROCEDURE — 80053 COMPREHEN METABOLIC PANEL: CPT

## 2024-08-27 PROCEDURE — 83735 ASSAY OF MAGNESIUM: CPT

## 2024-08-27 PROCEDURE — 36415 COLL VENOUS BLD VENIPUNCTURE: CPT

## 2024-08-27 NOTE — PROGRESS NOTES
I have repeated Rivera's lipid panel.  Unfortunately even with dietary modifications both total cholesterol and LDL are only modestly improved.  Based on this, normally I would recommend starting statin but due to his intolerance to Crestor, would recommend starting Zetia if he is willing.  Common side effects include fatigue, diarrhea, cough, and extremity pain.  He also has a very slightly elevated alkaline phosphatase which we will obtain a gamma GGT and repeat CMP in 1 month.  Magnesium level is normal.    Can we let him know?    Thank you,    Wesley Celaya    ?  This document has been electronically signed by Wesley Celaya MD on August 27, 2024 16:37 EDT

## 2024-08-27 NOTE — PROGRESS NOTES
Gamma GGT normal.  If alkaline phosphatase repeatedly elevated in 1 month, will pursue further workup.    ?  This document has been electronically signed by Wesley Celaya MD on August 27, 2024 17:19 EDT

## 2024-08-29 DIAGNOSIS — K21.9 GASTROESOPHAGEAL REFLUX DISEASE WITHOUT ESOPHAGITIS: ICD-10-CM

## 2024-08-29 DIAGNOSIS — E78.5 HYPERLIPIDEMIA, UNSPECIFIED HYPERLIPIDEMIA TYPE: Primary | ICD-10-CM

## 2024-08-29 RX ORDER — EZETIMIBE 10 MG/1
10 TABLET ORAL DAILY
Qty: 90 TABLET | Refills: 1 | Status: SHIPPED | OUTPATIENT
Start: 2024-08-29

## 2024-08-29 RX ORDER — PANTOPRAZOLE SODIUM 40 MG/1
40 TABLET, DELAYED RELEASE ORAL DAILY
Qty: 90 TABLET | Refills: 0 | Status: SHIPPED | OUTPATIENT
Start: 2024-08-29

## 2024-11-14 ENCOUNTER — LAB (OUTPATIENT)
Dept: LAB | Facility: HOSPITAL | Age: 59
End: 2024-11-14
Payer: COMMERCIAL

## 2024-11-14 DIAGNOSIS — C61 PROSTATE CANCER: ICD-10-CM

## 2024-11-14 DIAGNOSIS — R74.8 ELEVATED ALKALINE PHOSPHATASE LEVEL: ICD-10-CM

## 2024-11-14 LAB
ALBUMIN SERPL-MCNC: 3.9 G/DL (ref 3.5–5.2)
ALBUMIN/GLOB SERPL: 1.3 G/DL
ALP SERPL-CCNC: 105 U/L (ref 39–117)
ALT SERPL W P-5'-P-CCNC: 32 U/L (ref 1–41)
ANION GAP SERPL CALCULATED.3IONS-SCNC: 10 MMOL/L (ref 5–15)
AST SERPL-CCNC: 28 U/L (ref 1–40)
BILIRUB SERPL-MCNC: 0.5 MG/DL (ref 0–1.2)
BUN SERPL-MCNC: 16 MG/DL (ref 8–23)
BUN/CREAT SERPL: 11.7 (ref 7–25)
CALCIUM SPEC-SCNC: 8.8 MG/DL (ref 8.6–10.5)
CHLORIDE SERPL-SCNC: 104 MMOL/L (ref 98–107)
CO2 SERPL-SCNC: 27 MMOL/L (ref 22–29)
CREAT SERPL-MCNC: 1.37 MG/DL (ref 0.76–1.27)
EGFRCR SERPLBLD CKD-EPI 2021: 58.7 ML/MIN/1.73
GLOBULIN UR ELPH-MCNC: 2.9 GM/DL
GLUCOSE SERPL-MCNC: 87 MG/DL (ref 65–99)
POTASSIUM SERPL-SCNC: 4.5 MMOL/L (ref 3.5–5.2)
PROT SERPL-MCNC: 6.8 G/DL (ref 6–8.5)
PSA SERPL-MCNC: 3.89 NG/ML (ref 0–4)
SODIUM SERPL-SCNC: 141 MMOL/L (ref 136–145)

## 2024-11-14 PROCEDURE — 84153 ASSAY OF PSA TOTAL: CPT

## 2024-11-14 PROCEDURE — 80053 COMPREHEN METABOLIC PANEL: CPT

## 2024-11-14 PROCEDURE — 36415 COLL VENOUS BLD VENIPUNCTURE: CPT

## 2024-11-15 DIAGNOSIS — R79.89 ELEVATED SERUM CREATININE: Primary | ICD-10-CM

## 2024-11-15 NOTE — PROGRESS NOTES
Alkaline phosphatase is returned to normal.  Creatinine is slightly elevated at 1.37 and GFR is slightly decreased at 58.7.  This can be seen sometimes in the context of dehydration, as these values were previously normal.  I would recommend repeating this lab in 3 months to continue to trend his renal function.    Thank you,    Wesley Celaya     ?  This document has been electronically signed by Wesley Celaya MD on November 15, 2024 07:52 EST

## 2024-11-18 ENCOUNTER — PREP FOR SURGERY (OUTPATIENT)
Dept: OTHER | Facility: HOSPITAL | Age: 59
End: 2024-11-18
Payer: COMMERCIAL

## 2024-11-18 ENCOUNTER — OFFICE VISIT (OUTPATIENT)
Dept: UROLOGY | Age: 59
End: 2024-11-18
Payer: COMMERCIAL

## 2024-11-18 VITALS
HEIGHT: 62 IN | BODY MASS INDEX: 25.21 KG/M2 | WEIGHT: 137 LBS | DIASTOLIC BLOOD PRESSURE: 93 MMHG | SYSTOLIC BLOOD PRESSURE: 152 MMHG

## 2024-11-18 DIAGNOSIS — C61 PROSTATE CANCER: Primary | ICD-10-CM

## 2024-11-18 LAB
BILIRUB BLD-MCNC: NEGATIVE MG/DL
CLARITY, POC: CLEAR
COLOR UR: YELLOW
EXPIRATION DATE: NORMAL
GLUCOSE UR STRIP-MCNC: NEGATIVE MG/DL
KETONES UR QL: NEGATIVE
LEUKOCYTE EST, POC: NEGATIVE
Lab: NORMAL
NITRITE UR-MCNC: NEGATIVE MG/ML
PH UR: 8 [PH] (ref 5–8)
PROT UR STRIP-MCNC: NEGATIVE MG/DL
RBC # UR STRIP: NEGATIVE /UL
SP GR UR: 1.03 (ref 1–1.03)
UROBILINOGEN UR QL: NORMAL

## 2024-11-18 PROCEDURE — 81003 URINALYSIS AUTO W/O SCOPE: CPT | Performed by: UROLOGY

## 2024-11-18 PROCEDURE — 99213 OFFICE O/P EST LOW 20 MIN: CPT | Performed by: UROLOGY

## 2024-11-18 RX ORDER — SODIUM CHLORIDE 9 MG/ML
40 INJECTION, SOLUTION INTRAVENOUS AS NEEDED
OUTPATIENT
Start: 2024-11-18

## 2024-11-18 RX ORDER — SODIUM CHLORIDE 0.9 % (FLUSH) 0.9 %
10 SYRINGE (ML) INJECTION AS NEEDED
OUTPATIENT
Start: 2024-11-18

## 2024-11-18 RX ORDER — SODIUM CHLORIDE 0.9 % (FLUSH) 0.9 %
3 SYRINGE (ML) INJECTION EVERY 12 HOURS SCHEDULED
OUTPATIENT
Start: 2024-11-18

## 2024-11-18 NOTE — PROGRESS NOTES
"Chief Complaint  Prostate Cancer    Subjective          Rivera Ku presents to [facility] UROLOGY      History of Present Illness  Mr. Ku is here for follow-up for prostate cancer.  He had a Pall Mall 6 prostate cancer in both the right and left side, small amounts.  He did have a region of interest from his MRI that was negative.    Oncology/Hematology History   Prostate cancer   5/16/2024 Cancer Staged    Staging form: Prostate, AJCC 8th Edition  - Clinical stage from 5/16/2024: Stage I (cT1c, cN0, cM0, PSA: 5, Grade Group: 1) - Signed by Ashley Sosa MD on 5/22/2024 5/22/2024 Initial Diagnosis    Prostate cancer           Objective   Vital Signs:   /93   Ht 157.5 cm (62\")   Wt 62.1 kg (137 lb)   BMI 25.06 kg/m²       Physical Exam  Vitals and nursing note reviewed.   Constitutional:       Appearance: Normal appearance. He is well-developed.   Pulmonary:      Effort: Pulmonary effort is normal.      Breath sounds: Normal air entry.   Neurological:      Mental Status: He is alert and oriented to person, place, and time.      Motor: Motor function is intact.   Psychiatric:         Mood and Affect: Mood normal.         Behavior: Behavior normal.          Result Review :   The following data was reviewed by: Ashley Sosa MD on 11/18/2024:    Results for orders placed or performed in visit on 11/18/24   POC Urinalysis Dipstick, Automated    Collection Time: 11/18/24  8:44 AM    Specimen: Urine   Result Value Ref Range    Color Yellow Yellow, Straw, Dark Yellow, Christina    Clarity, UA Clear Clear    Specific Gravity  1.030 1.005 - 1.030    pH, Urine 8.0 5.0 - 8.0    Leukocytes Negative Negative    Nitrite, UA Negative Negative    Protein, POC Negative Negative mg/dL    Glucose, UA Negative Negative mg/dL    Ketones, UA Negative Negative    Urobilinogen, UA 0.2 E.U./dL Normal, 0.2 E.U./dL    Bilirubin Negative Negative    Blood, UA Negative Negative    Lot Number [id number]     " Expiration Date 92,025        PSA          3/12/2024    08:26 11/14/2024    07:39   PSA   PSA 5.190  3.890             Assessment and Plan    Diagnoses and all orders for this visit:    1. Prostate cancer (Primary)  -     POC Urinalysis Dipstick, Automated  -     PSA DIAGNOSTIC; Future  -     MRI Prostate With & Without Contrast; Future    PSA is down at 3.8.  We will recheck again in 6 months and repeat his MRI in prostate biopsy at that time.  Will get his MRI and repeat his prostate biopsy then and I will see him at that time.  Will get a PSA at the same time as well.  We will continue active surveillance at this time.  Orders for his prostate biopsy are in the chart.            Follow Up       No follow-ups on file.  Patient was given instructions and counseling regarding his condition or for health maintenance advice. Please see specific information pulled into the AVS if appropriate.

## 2024-11-18 NOTE — H&P
[facility]   Urology HISTORY AND PHYSICAL    Patient Name: Rivera Ku  : 1965  MRN: [id number]  Primary Care Physician:  Wesley Celaya MD  Date of admission: (Not on file)    Subjective   Subjective     History of Present Illness  Patient has prostate cancer and is on active surveillance and presents for MRI fusion transrectal ultrasound and biopsy of the prostate      Personal History     Past Medical History:   Diagnosis Date    Elevated cholesterol     Elevated PSA     GERD (gastroesophageal reflux disease)     Hyperlipidemia     Ocular migraine 2023       Past Surgical History:   Procedure Laterality Date    COLONOSCOPY      COLONOSCOPY N/A 2024    Procedure: COLONOSCOPY;  Surgeon: Paul Herrera MD;  Location: [facility] ENDOSCOPY;  Service: General;  Laterality: N/A;  NORMAL    PROSTATE BIOPSY N/A 2024    Procedure: MAGNETIC RESONANCE IMAGING FUSION TRANSRECTAL ULTRASOUND AND BIOPSY OF THE PROSTATE;  Surgeon: Ashley Sosa MD;  Location: [facility] MAIN OR;  Service: Urology;  Laterality: N/A;       Family History: family history includes Cancer in his father; Hearing loss in his father; Lung cancer in his father. Otherwise pertinent FHx was reviewed and not pertinent to current issue.    Social History:  reports that he quit smoking about 26 years ago. His smoking use included cigarettes. He started smoking about 35 years ago. He has a 4.6 pack-year smoking history. He has been exposed to tobacco smoke. He has quit using smokeless tobacco.  His smokeless tobacco use included chew. He reports that he does not drink alcohol and does not use drugs.    Home Medications:  Blood Glucose Monitor System, Blood Pressure Cuff, aspirin, ezetimibe, glucose blood, nortriptyline, pantoprazole, and tamsulosin    Allergies:  Allergies   Allergen Reactions    Crestor [Rosuvastatin] Other (See Comments) and Unknown - Low Severity     Leg weakness and brain fog       Objective     Objective     Vitals:   BP: (152)/(93) 152/93    Physical Exam  Constitutional:       Appearance: Normal appearance.   Cardiovascular:      Rate and Rhythm: Normal rate and regular rhythm.   Pulmonary:      Effort: Pulmonary effort is normal.      Breath sounds: Normal breath sounds.   Neurological:      Mental Status: He is alert. Mental status is at baseline.   Psychiatric:         Mood and Affect: Mood and affect normal.         Speech: Speech normal.         Judgment: Judgment normal.         Result Review    Result Review:  I have personally reviewed the results from the time of this admission to 11/18/2024 09:38 EST and agree with these findings:  [x]  Laboratory  []  Microbiology  [x]  Radiology  []  EKG/Telemetry   []  Cardiology/Vascular   []  Pathology  [x]  Old records  []  Other:      Assessment & Plan   Assessment / Plan       Active Hospital Problems:  There are no active hospital problems to display for this patient.      Plan: MRI fusion transrectal ultrasound and biopsy of the prostate  Risks and benefits discussed with patient and they are agreeable to proceed.    VTE Prophylaxis:  No VTE prophylaxis order currently exists.        CODE STATUS:           Electronically signed by Ashley Sosa MD, 11/18/24, 9:38 AM EST.

## 2024-12-03 DIAGNOSIS — K21.9 GASTROESOPHAGEAL REFLUX DISEASE WITHOUT ESOPHAGITIS: ICD-10-CM

## 2024-12-03 RX ORDER — PANTOPRAZOLE SODIUM 40 MG/1
40 TABLET, DELAYED RELEASE ORAL DAILY
Qty: 90 TABLET | Refills: 0 | Status: SHIPPED | OUTPATIENT
Start: 2024-12-03

## 2025-01-08 ENCOUNTER — OFFICE VISIT (OUTPATIENT)
Dept: NEUROLOGY | Facility: CLINIC | Age: 60
End: 2025-01-08
Payer: COMMERCIAL

## 2025-01-08 VITALS
BODY MASS INDEX: 25.65 KG/M2 | WEIGHT: 139.4 LBS | SYSTOLIC BLOOD PRESSURE: 105 MMHG | DIASTOLIC BLOOD PRESSURE: 55 MMHG | HEART RATE: 89 BPM | HEIGHT: 62 IN

## 2025-01-08 DIAGNOSIS — G43.109 COMPLICATED MIGRAINE: Primary | ICD-10-CM

## 2025-01-08 RX ORDER — NORTRIPTYLINE HYDROCHLORIDE 25 MG/1
25 CAPSULE ORAL NIGHTLY
Qty: 90 CAPSULE | Refills: 3 | Status: SHIPPED | OUTPATIENT
Start: 2025-01-08 | End: 2025-04-08

## 2025-01-08 NOTE — PROGRESS NOTES
"Chief Complaint  Migraine    Subjective          Rivera Ku presents to [facility]  History of Present Illness    History of Present Illness  The patient is a 59-year-old male who presents to the office for a follow-up for complicated migraine.    He reports an overall improvement in his condition, with the exception of a minor visual disturbance experienced on New Year's Day. The duration of this episode was approximately 15 minutes, and he does not recall any specific triggers. He also mentions a dull headache but did not require any analgesics such as aspirin or Advil. He has been trying to remember when the last episode occurred, but he does not remember exactly when it was, but he thinks it has probably been a year since the last episode. He remains on nortriptyline for preventative therapy, taking 25 mg nightly.    MEDICATIONS  Nortriptyline       Objective   Vital Signs:   /55   Pulse 89   Ht 157.5 cm (62\")   Wt 63.2 kg (139 lb 6.4 oz)   BMI 25.50 kg/m²     Physical Exam  HENT:      Head: Normocephalic.   Pulmonary:      Effort: Pulmonary effort is normal.   Neurological:      Mental Status: He is alert and oriented to person, place, and time.      Sensory: Sensation is intact.      Motor: Motor function is intact.      Coordination: Coordination is intact.      Deep Tendon Reflexes: Reflexes are normal and symmetric.      Neurological Exam  Mental Status  Alert. Oriented to person, place, and time.    Sensory  Normal sensation.    Reflexes  Deep tendon reflexes are 2+ and symmetric in all four extremities.    Coordination    Finger-to-nose, rapid alternating movements and heel-to-shin normal bilaterally without dysmetria.      Result Review :   CBC:  Lab Results   Component Value Date    WBC 7.07 03/12/2024    RBC 4.98 03/12/2024    HGB 14.8 03/12/2024    HCT 44.2 03/12/2024    MCV 88.8 03/12/2024    MCH 29.7 03/12/2024    MCHC 33.5 03/12/2024    RDW " 13.1 03/12/2024     03/12/2024     CMP:  Lab Results   Component Value Date    BUN 16 11/14/2024    CREATININE 1.37 (H) 11/14/2024     11/14/2024    K 4.5 11/14/2024     11/14/2024    CALCIUM 8.8 11/14/2024    ALBUMIN 3.9 11/14/2024    BILITOT 0.5 11/14/2024    ALKPHOS 105 11/14/2024    AST 28 11/14/2024    ALT 32 11/14/2024     LIPID PANEL:  Lab Results   Component Value Date    TRIG 82 08/27/2024    HDL 51 08/27/2024    VLDL 14 08/27/2024     (H) 08/27/2024    LDLHDL 3.15 08/27/2024     MAGNESIUM:   Lab Results   Component Value Date    MG 1.9 08/27/2024                Assessment and Plan    Diagnoses and all orders for this visit:    1. Complicated migraine (Primary)    Other orders  -     nortriptyline (PAMELOR) 25 MG capsule; Take 1 capsule by mouth Every Night for 90 days.  Dispense: 90 capsule; Refill: 3        Assessment & Plan  1. Complicated migraine.  His vital signs are within normal limits today, and laboratory results do not indicate any abnormalities. The frequency of breakthrough episodes is minimal, occurring only once every few months. He will maintain his current regimen of nortriptyline 25 mg nightly for preventative therapy. A prescription refill for nortriptyline has been provided. He is advised to contact the office if there are any concerns or if his condition deteriorates.    Follow-up  The patient will follow up in 1 year.         Follow Up   Return in about 1 year (around 1/8/2026) for Migraine f/u.  Patient was given instructions and counseling regarding his condition or for health maintenance advice. Please see specific information pulled into the AVS if appropriate.       Patient or patient representative verbalized consent for the use of Ambient Listening during the visit with  ROSE MARY Lee for chart documentation. 1/8/2025  08:28 EST

## 2025-01-08 NOTE — PATIENT INSTRUCTIONS
Migraine Headache  A migraine headache is an intense pulsing or throbbing pain on one or both sides of the head. Migraine headaches may also cause other symptoms, such as nausea, vomiting, and sensitivity to light and noise. A migraine headache can last from 4 hours to 3 days. Talk with your health care provider about what things may bring on (trigger) your migraine headaches.  What are the causes?  The exact cause is not known. However, a migraine may be caused when nerves in the brain get irritated and release chemicals that cause blood vessels to become inflamed. This inflammation causes pain. Migraines may be triggered or caused by:  Smoking.  Medicines, such as:  Nitroglycerin, which is used to treat chest pain.  Birth control pills.  Estrogen.  Certain blood pressure medicines.  Foods or drinks that contain nitrates, glutamate, aspartame, MSG, or tyramine.  Certain foods or drinks, such as aged cheeses, chocolate, alcohol, or caffeine.  Doing physical activity that is very hard.  Other triggers may include:  Menstruation.  Pregnancy.  Hunger.  Stress.  Getting too much or too little sleep.  Weather changes.  Tiredness (fatigue).  What increases the risk?  The following factors may make you more likely to have migraine headaches:  Being between the ages of 25-55 years old.  Being female.  Having a family history of migraine headaches.  Being .  Having a mental health condition, such as depression or anxiety.  Being obese.  What are the signs or symptoms?  The main symptom of this condition is pulsing or throbbing pain. This pain may:  Happen in any area of the head, such as on one or both sides.  Make it hard to do daily activities.  Get worse with physical activity.  Get worse around bright lights, loud noises, or smells.  Other symptoms may include:  Nausea.  Vomiting.  Dizziness.  Before a migraine headache starts, you may get warning signs (an aura). An aura may include:  Seeing flashing lights or  having blind spots.  Seeing bright spots, halos, or zigzag lines.  Having tunnel vision or blurred vision.  Having numbness or a tingling feeling.  Having trouble talking.  Having muscle weakness.  After a migraine ends, you may have symptoms. These may include:  Feeling tired.  Trouble concentrating.  How is this diagnosed?  A migraine headache can be diagnosed based on:  Your symptoms.  A physical exam.  Tests, such as:  A CT scan or an MRI of the head. These tests can help rule out other causes of headaches.  Taking fluid from the spine (lumbar puncture) to examine it (cerebrospinal fluid analysis, or CSF analysis).  How is this treated?  This condition may be treated with medicines that:  Relieve pain and nausea.  Prevent migraines.  Treatment may also include:  Acupuncture.  Lifestyle changes like avoiding foods that trigger migraine headaches.  Learning ways to control your body (biofeedback).  Talk therapy to help you know and deal with negative thoughts (cognitive behavioral therapy).  Follow these instructions at home:  Medicines  Take over-the-counter and prescription medicines only as told by your provider.  Ask your provider if the medicine prescribed to you:  Requires you to avoid driving or using machinery.  Can cause constipation. You may need to take these actions to prevent or treat constipation:  Drink enough fluid to keep your pee (urine) pale yellow.  Take over-the-counter or prescription medicines.  Eat foods that are high in fiber, such as beans, whole grains, and fresh fruits and vegetables.  Limit foods that are high in fat and processed sugars, such as fried or sweet foods.  Lifestyle    Do not drink alcohol.  Do not use any products that contain nicotine or tobacco. These products include cigarettes, chewing tobacco, and vaping devices, such as e-cigarettes. If you need help quitting, ask your provider.  Get 7-9 hours of sleep each night, or the amount recommended by your provider.  Find  ways to manage stress, such as meditation, deep breathing, or yoga.  Try to exercise regularly. This can help lessen how bad and how often your migraines occur.  General instructions  Keep a journal to find out what triggers your migraines, so you can avoid those things. For example, write down:  What you eat and drink.  How much sleep you get.  Any change to your diet or medicines.  If you have a migraine headache:  Avoid things that make your symptoms worse, such as bright lights.  Lie down in a dark, quiet room.  Do not drive or use machinery.  Ask your provider what activities are safe for you while you have symptoms.  Keep all follow-up visits. Your provider will monitor your symptoms and recommend any further treatment.  Where to find more information  Coalition for Headache and Migraine Patients (CHAMP): headachemigraine.org  American Migraine Foundation: americanmigrainefoundation.org  National Headache Foundation: headaches.org  Contact a health care provider if:  You have symptoms that are different or worse than your usual migraine headache symptoms.  You have more than 15 days of headaches in one month.  Get help right away if:  Your migraine headache becomes severe or lasts more than 72 hours.  You have a fever or stiff neck.  You have vision loss.  Your muscles feel weak or like you cannot control them.  You lose your balance often or have trouble walking.  You faint.  You have a seizure.  This information is not intended to replace advice given to you by your health care provider. Make sure you discuss any questions you have with your health care provider.  Document Revised: 08/14/2023 Document Reviewed: 08/14/2023  Elsevier Patient Education © 2024 Elsevier Inc.

## 2025-03-03 DIAGNOSIS — E78.5 HYPERLIPIDEMIA, UNSPECIFIED HYPERLIPIDEMIA TYPE: ICD-10-CM

## 2025-03-03 DIAGNOSIS — K21.9 GASTROESOPHAGEAL REFLUX DISEASE WITHOUT ESOPHAGITIS: ICD-10-CM

## 2025-03-03 RX ORDER — EZETIMIBE 10 MG/1
10 TABLET ORAL DAILY
Qty: 30 TABLET | Refills: 1 | Status: SHIPPED | OUTPATIENT
Start: 2025-03-03

## 2025-03-03 RX ORDER — PANTOPRAZOLE SODIUM 40 MG/1
40 TABLET, DELAYED RELEASE ORAL DAILY
Qty: 90 TABLET | Refills: 0 | Status: SHIPPED | OUTPATIENT
Start: 2025-03-03

## 2025-04-03 ENCOUNTER — TELEPHONE (OUTPATIENT)
Dept: UROLOGY | Age: 60
End: 2025-04-03
Payer: COMMERCIAL

## 2025-04-03 NOTE — TELEPHONE ENCOUNTER
Patient returned call. Spoke to patient and scheduled procedure for 5/22/25. I went over preop instructions and informed that I would be mailing the information. Gave patient appointment information for MRI of the prostate-5/7/25 at [facility]-do not wear metal or jewelry to the appointment. Also, gave appointment info for cardiology appointment on 4/7 at 9:00 which will be needed for clearance for surgery. Patient voiced understanding.

## 2025-04-03 NOTE — TELEPHONE ENCOUNTER
Left message asking for patient to call back to give MRI appointment information and schedule prostate biopsy.

## 2025-04-07 ENCOUNTER — TELEPHONE (OUTPATIENT)
Dept: UROLOGY | Age: 60
End: 2025-04-07
Payer: COMMERCIAL

## 2025-04-07 ENCOUNTER — TELEPHONE (OUTPATIENT)
Dept: CARDIOLOGY | Facility: CLINIC | Age: 60
End: 2025-04-07

## 2025-04-07 ENCOUNTER — OFFICE VISIT (OUTPATIENT)
Dept: CARDIOLOGY | Facility: CLINIC | Age: 60
End: 2025-04-07
Payer: COMMERCIAL

## 2025-04-07 VITALS
BODY MASS INDEX: 25.76 KG/M2 | WEIGHT: 140 LBS | DIASTOLIC BLOOD PRESSURE: 82 MMHG | HEART RATE: 84 BPM | HEIGHT: 62 IN | SYSTOLIC BLOOD PRESSURE: 126 MMHG

## 2025-04-07 DIAGNOSIS — R00.2 PALPITATIONS: ICD-10-CM

## 2025-04-07 DIAGNOSIS — Z01.818 PRE-OP EVALUATION: Primary | ICD-10-CM

## 2025-04-07 PROCEDURE — 93000 ELECTROCARDIOGRAM COMPLETE: CPT | Performed by: FAMILY MEDICINE

## 2025-04-07 PROCEDURE — 99213 OFFICE O/P EST LOW 20 MIN: CPT | Performed by: FAMILY MEDICINE

## 2025-04-07 NOTE — TELEPHONE ENCOUNTER
Left message asking patient to call back to receive instructions on how long to hold aspirin prior to surgery. Patient is to hold aspirin for 7 days prior to surgery. If patient calls back, please relay this information and document that patient received/understood instructions.

## 2025-04-07 NOTE — TELEPHONE ENCOUNTER
PATIENT CALLED.  I TOLD HIM, DAMI LAGUERRE:    Left message asking patient to call back to receive instructions on how long to hold aspirin prior to surgery. Patient is to hold aspirin for 7 days prior to surgery. If patient calls back, please relay this information and document that patient received/understood instructions.     PATIENT VOICED UNDERSTANDING.

## 2025-04-07 NOTE — PROGRESS NOTES
Chief Complaint  Follow-up    Subjective        History of Present Illness  Rivera Ku presents to [facility] CARDIOLOG[facility]   Mr. Ku is a 59-year-old male patient coming in today for evaluation before undergoing prostate biopsy.  He was last seen and evaluated by cardiology in December 2022 due to symptoms of palpitations, echocardiogram showed normal cardiac function, and Holter monitor study only showed short runs of PACs, otherwise was unremarkable.  He has no further complaints of palpitations.  He has no anginal symptoms, denies any chest pains or shortness of breath. He is able to perform routine daily activities without any issues.      Past Medical History:   Diagnosis Date    Elevated cholesterol     Elevated PSA     GERD (gastroesophageal reflux disease)     Hyperlipidemia     Ocular migraine 01/31/2023       Allergies   Allergen Reactions    Crestor [Rosuvastatin] Other (See Comments) and Unknown - Low Severity     Leg weakness and brain fog        Past Surgical History:   Procedure Laterality Date    COLONOSCOPY      COLONOSCOPY N/A 01/18/2024    Procedure: COLONOSCOPY;  Surgeon: Paul Herrera MD;  Location: [facility] ENDOSCOPY;  Service: General;  Laterality: N/A;  NORMAL    PROSTATE BIOPSY N/A 05/16/2024    Procedure: MAGNETIC RESONANCE IMAGING FUSION TRANSRECTAL ULTRASOUND AND BIOPSY OF THE PROSTATE;  Surgeon: Ashley Sosa MD;  Location: [facility] MAIN OR;  Service: Urology;  Laterality: N/A;        Social History  He  reports that he quit smoking about 26 years ago. His smoking use included cigarettes. He started smoking about 35 years ago. He has a 4.6 pack-year smoking history. He has been exposed to tobacco smoke. He has quit using smokeless tobacco.  His smokeless tobacco use included chew. He reports that he does not drink alcohol and does not use drugs.    Family History  His family history includes Cancer in his father; Hearing loss in his father; Lung cancer in  "his father.       Current Outpatient Medications on File Prior to Visit   Medication Sig    Blood Glucose Monitoring Suppl (Blood Glucose Monitor System) w/Device kit Use 1 kit 1 (One) Time As Needed (When experiencing weakness) for up to 1 dose.    Blood Pressure Monitoring (Blood Pressure Cuff) misc Use 1 kit 1 (One) Time As Needed (Check blood pressure during weeks pill) for up to 1 dose.    ezetimibe (ZETIA) 10 MG tablet TAKE 1 TABLET BY MOUTH DAILY    glucose blood test strip Use with weak spell to look for hypoglycemia    nortriptyline (PAMELOR) 25 MG capsule Take 1 capsule by mouth Every Night for 90 days.    pantoprazole (PROTONIX) 40 MG EC tablet TAKE 1 TABLET BY MOUTH DAILY    tamsulosin (FLOMAX) 0.4 MG capsule 24 hr capsule Take 1 capsule by mouth Daily.    aspirin 81 MG EC tablet Take 1 tablet by mouth Daily.     No current facility-administered medications on file prior to visit.         Review of Systems   Constitutional:  Negative for fatigue.   Respiratory:  Negative for cough, chest tightness and shortness of breath.    Cardiovascular:  Negative for chest pain, palpitations and leg swelling.   Gastrointestinal:  Negative for nausea and vomiting.   Neurological:  Negative for dizziness and syncope.        Objective   Vitals:    04/07/25 0913   BP: 126/82   Pulse: 84   Weight: 63.5 kg (140 lb)   Height: 157.5 cm (62\")         Physical Exam  General : Alert, awake, no acute distress  Neck : Supple, no carotid bruit, no jugular venous distention  CVS : Regular rate and rhythm, no murmur, no rubs or gallops  Lungs: Clear to auscultation bilaterally, no crackles or rhonchi  Abdomen: Soft, nontender, bowel sounds active  Extremities: Warm, well-perfused, no pedal edema      Result Review     The following data was reviewed by Christine Maldonado, APRN  No results found for: \"PROBNP\"  CMP          8/27/2024    07:48 11/14/2024    07:39   CMP   Glucose 93  87    BUN 11  16    Creatinine 1.20  1.37    EGFR 68.8  " "58.7    Sodium 141  141    Potassium 4.2  4.5    Chloride 103  104    Calcium 9.1  8.8    Total Protein 7.0  6.8    Albumin 4.1  3.9    Globulin 2.9  2.9    Total Bilirubin 0.7  0.5    Alkaline Phosphatase 126  105    AST (SGOT) 18  28    ALT (SGPT) 19  32    Albumin/Globulin Ratio 1.4  1.3    BUN/Creatinine Ratio 9.2  11.7    Anion Gap 10.0  10.0         Lab Results   Component Value Date    TSH 3.400 03/12/2024      Lab Results   Component Value Date    FREET4 1.12 01/31/2023      No results found for: \"DDIMERQUANT\"  Magnesium   Date Value Ref Range Status   08/27/2024 1.9 1.6 - 2.4 mg/dL Final      No results found for: \"DIGOXIN\"   No results found for: \"TROPONINT\"        Lipid Panel          8/27/2024    07:48   Lipid Panel   Total Cholesterol 228    Triglycerides 82    HDL Cholesterol 51    VLDL Cholesterol 14    LDL Cholesterol  163    LDL/HDL Ratio 3.15          Results for orders placed in visit on 12/29/22    Adult Transthoracic Echo Complete W/ Cont if Necessary Per Protocol    Interpretation Summary  Normal left ventricular systolic function with an estimated ejection fraction of 65%.  No regional wall motion abnormalities were observed.  Left ventricular diastolic function was indeterminate.  Mild mitral regurgitation and mild tricuspid regurgitation, but no hemodynamically significant valvular pathology.  Estimated right ventricular systolic pressure was within normal limits.  No evidence of pericardial effusion.        ECG 12 Lead    Date/Time: 4/7/2025 8:47 AM  Performed by: Christine Maldonado APRN    Authorized by: Christine Maldonado APRN  Comparison: compared with previous ECG from 4/18/2024  Comparison to previous ECG: Change in rate compared to previous EKG  Rhythm: sinus rhythm  Rate: normal  Conduction: conduction normal  ST Segments: ST segments normal  T Waves: T waves normal  QRS axis: normal    Clinical impression: normal ECG              Assessment and Plan   Diagnoses and all orders for this " visit:    1. Pre-op evaluation (Primary)  -     ECG 12 Lead    2. Palpitations    Previous cardiac workup for palpitations including unremarkable echocardiogram and Holter monitor.  He has no anginal symptoms.  He has no further symptoms of palpitations.  EKG in office shows normal sinus rhythm without any concerning findings.  No further cardiac testing is required before moving forward with upcoming surgical procedure.            Follow Up   Return if symptoms worsen or fail to improve.    Patient was given instructions and counseling regarding his condition or for health maintenance advice. Please see specific information pulled into the AVS if appropriate.     Signed,  Christine Maldonado, APRN  04/07/2025     Dictated Utilizing Dragon Dictation: Please note that portions of this note were completed with a voice recognition program.  Part of this note may be an electronic transcription/translation of spoken language to printed text using the Dragon Dictation System.

## 2025-05-02 DIAGNOSIS — N40.1 BENIGN PROSTATIC HYPERPLASIA WITH WEAK URINARY STREAM: ICD-10-CM

## 2025-05-02 DIAGNOSIS — R39.12 BENIGN PROSTATIC HYPERPLASIA WITH WEAK URINARY STREAM: ICD-10-CM

## 2025-05-02 DIAGNOSIS — E78.5 HYPERLIPIDEMIA, UNSPECIFIED HYPERLIPIDEMIA TYPE: ICD-10-CM

## 2025-05-02 RX ORDER — TAMSULOSIN HYDROCHLORIDE 0.4 MG/1
1 CAPSULE ORAL DAILY
Qty: 90 CAPSULE | Refills: 4 | Status: SHIPPED | OUTPATIENT
Start: 2025-05-02

## 2025-05-04 RX ORDER — EZETIMIBE 10 MG/1
10 TABLET ORAL DAILY
Qty: 90 TABLET | Refills: 2 | Status: SHIPPED | OUTPATIENT
Start: 2025-05-04

## 2025-05-21 RX ORDER — NORTRIPTYLINE HYDROCHLORIDE 25 MG/1
25 CAPSULE ORAL NIGHTLY
COMMUNITY

## 2025-05-21 NOTE — PRE-PROCEDURE INSTRUCTIONS
PATIENT INSTRUCTED TO BE:    - NOTHING TO EAT AFTER MIDNIGHT OR CHEW, EXCEPT CAN HAVE SIPS OF WATER WITH MEDICATIONS OR CLEAR LIQUIDS 2 HOURS PRIOR TO SURGERY ARRIVAL TIME , NO MORE THAN 8 OZ. (NOTHING RED)     - TO HOLD ALL VITAMINS, SUPPLEMENTS, NSAIDS FOR ONE WEEK PRIOR TO THEIR SURGICAL PROCEDURE    - DO NOT TAKE _N/A_7 DAYS PRIOR TO PROCEDURE PER ANESTHESIA RECOMMENDATIONS/INSTRUCTIONS     - INSTRUCTED PT TO USE SURGICAL SOAP 1 TIME THE NIGHT PRIOR TO SURGERY ___________ OR THE AM OF SURGERY _[id number]____________   USE THE SOAP FROM NECK TO TOES, AVOID THEIR FACE, HAIR, AND PRIVATE PARTS. IF USE THE SOAP THE NIGHT PRIOR TO SURGERY, CHANGE BED LINENS AND NO PETS IN THE BED.     INSTRUCTED NO LOTIONS, JEWELRY, PIERCINGS,  NAIL POLISH, OR DEODORANT DAY OF SURGERY    - IF DIABETIC, CHECK BLOOD GLUCOSE IF LESS THAN 70 OR HAVING SYMPTOMS CALL THE PREOP AREA FOR INSTRUCTIONS ON AM OF SURGERY  (PHONE NUMBER: _____________________)    -INSTRUCTED TO TAKE THE FOLLOWING MEDICATIONS THE DAY OF SURGERY WITH SIPS OF WATER:    ZETIA,FLOMAX,PROTONIX    - DO NOT BRING ANY MEDICATIONS WITH YOU TO THE HOSPITAL THE DAY OF SURGERY, EXCEPT IF USE INHALERS. BRING INHALERS DAY OF SURGERY       - BRING CPAP OR BIPAP TO THE HOSPITAL ONLY IF YOU ARE SPENDING THE NIGHT    - DO NOT SMOKE OR VAPE 24 HOURS PRIOR TO PROCEDURE PER ANESTHESIA REQUEST     -MAKE SURE YOU HAVE A RIDE HOME OR SOMEONE TO STAY WITH YOU THE DAY OF THE PROCEDURE AFTER YOU GO HOME     - FOLLOW ANY OTHER INSTRUCTIONS GIVEN TO YOU BY YOUR SURGEON'S OFFICE.     - DAY OF SURGERY ____________,[facility] ( [address]), YOU CAN  PARK OR SELF PARK. ENTER THE PAVILION THRU MAIN ENTRANCE, TAKE ELEVATORS TO THE FIRST FLOOR, CHECK IN AT THE DESK FOR REGISTRATION/ SURGERY.                  OR    [facility] ([facility]),  PARK IN THE OPEN LOT, COME TO ENTRANCE C/ [facility], FIRST FLOOR, CHECK IN AT THE DESK FOR REGISTRATION/  SURGERY      - YOU WILL RECEIVE A PHONE CALL THE DAY PRIOR TO SURGERY BETWEEN 1PM AND 4 PM WITH ARRIVAL TIME, IF YOUR SURGERY IS ON A MONDAY YOU WILL RECEIVE A CALL THE FRIDAY PRIOR TO SURGERY DATE    - BRING CASH OR CREDIT CARD FOR COPAYMENT OF MEDICATIONS AFTER SURGERY IF YOU USE THE HOSPITAL PHARMACY (MEDS TO BED)    - PREADMISSION TESTING NURSE LUIS F -[phone]_ IF HAVE ANY QUESTIONS     -PATIENT PROVIDED THE NUMBER FOR PREOP SURGICAL DEPT IF HAD QUESTIONS AFTER HOURS PRIOR TO SURGERY (PHONE NUMBER: ________________.  INFORMED PT IF NO ANSWER, LEAVE A MESSAGE AND SOMEONE WILL RETURN THEIR CALL       PATIENT VERBALIZED UNDERSTANDING       Clear Liquid Diet        Find out when you need to start a clear liquid diet.   Think of “clear liquids” as anything you could read a newspaper through. This includes things like water, broth, sports drinks, or tea WITHOUT any kind of milk or cream.           Once you are told to start a clear liquid diet, only drink these things until 2 hours before arrival to the hospital or when the hospital says to stop. Total volume limitation: 8 oz.       Clear liquids you CAN drink:   Water   Clear broth: beef, chicken, vegetable, or bone broth with nothing in it   Gatorade   Lemonade or Gomez-aid   Soda   Tea, coffee (NO cream or honey)   Jell-O (without fruit)   Popsicles (without fruit or cream)   Italian ices   Juice without pulp: apple, white, grape   You may use salt, pepper, and sugar  NO RED  NO NOODLES    Do NOT drink:   Milk or cream   Soy milk, almond milk, coconut milk, or other non-dairy drinks and   creamers   Milkshakes or smoothies   Tomato juice   Orange juice   Grapefruit juice   Cream soups or any other than broth         Clear Liquid Diet:  Do NOT eat any solid food.  Do NOT eat or suck on mints or candy.  Do NOT chew gum.  Do NOT drink thick liquids like milk or juice with pulp in it.  Do NOT add milk, cream, or anything like soy milk or almond milk to coffee  or tea.        post operative complication

## 2025-05-22 ENCOUNTER — HOSPITAL ENCOUNTER (OUTPATIENT)
Facility: HOSPITAL | Age: 60
Setting detail: HOSPITAL OUTPATIENT SURGERY
Discharge: HOME | End: 2025-05-22
Attending: UROLOGY | Admitting: UROLOGY
Payer: COMMERCIAL

## 2025-05-22 ENCOUNTER — ANESTHESIA (OUTPATIENT)
Dept: PERIOP | Facility: HOSPITAL | Age: 60
End: 2025-05-22
Payer: COMMERCIAL

## 2025-05-22 ENCOUNTER — ANESTHESIA EVENT (OUTPATIENT)
Dept: PERIOP | Facility: HOSPITAL | Age: 60
End: 2025-05-22
Payer: COMMERCIAL

## 2025-05-22 VITALS
WEIGHT: 135.8 LBS | SYSTOLIC BLOOD PRESSURE: 115 MMHG | TEMPERATURE: 97.3 F | HEIGHT: 62 IN | HEART RATE: 91 BPM | DIASTOLIC BLOOD PRESSURE: 80 MMHG | OXYGEN SATURATION: 96 % | BODY MASS INDEX: 24.99 KG/M2 | RESPIRATION RATE: 14 BRPM

## 2025-05-22 DIAGNOSIS — C61 PROSTATE CANCER: ICD-10-CM

## 2025-05-22 PROCEDURE — 88305 TISSUE EXAM BY PATHOLOGIST: CPT | Performed by: UROLOGY

## 2025-05-22 PROCEDURE — 25010000002 CEFOXITIN PER 1 G: Performed by: ANESTHESIOLOGY

## 2025-05-22 PROCEDURE — S0260 H&P FOR SURGERY: HCPCS | Performed by: UROLOGY

## 2025-05-22 PROCEDURE — 76942 ECHO GUIDE FOR BIOPSY: CPT | Performed by: UROLOGY

## 2025-05-22 PROCEDURE — 25810000003 LACTATED RINGERS PER 1000 ML: Performed by: ANESTHESIOLOGY

## 2025-05-22 PROCEDURE — 25010000002 LIDOCAINE (CARDIAC)

## 2025-05-22 PROCEDURE — 55700 PR PROSTATE NEEDLE BIOPSY ANY APPROACH: CPT | Performed by: UROLOGY

## 2025-05-22 PROCEDURE — 25010000002 PROPOFOL 10 MG/ML EMULSION

## 2025-05-22 PROCEDURE — 25010000002 MIDAZOLAM PER 1MG: Performed by: ANESTHESIOLOGY

## 2025-05-22 RX ORDER — PROPOFOL 10 MG/ML
VIAL (ML) INTRAVENOUS AS NEEDED
Status: DISCONTINUED | OUTPATIENT
Start: 2025-05-22 | End: 2025-05-22 | Stop reason: SURG

## 2025-05-22 RX ORDER — SODIUM CHLORIDE, SODIUM LACTATE, POTASSIUM CHLORIDE, CALCIUM CHLORIDE 600; 310; 30; 20 MG/100ML; MG/100ML; MG/100ML; MG/100ML
9 INJECTION, SOLUTION INTRAVENOUS CONTINUOUS PRN
Status: DISCONTINUED | OUTPATIENT
Start: 2025-05-22 | End: 2025-05-22 | Stop reason: HOSPADM

## 2025-05-22 RX ORDER — MIDAZOLAM HYDROCHLORIDE 2 MG/2ML
2 INJECTION, SOLUTION INTRAMUSCULAR; INTRAVENOUS ONCE
Status: COMPLETED | OUTPATIENT
Start: 2025-05-22 | End: 2025-05-22

## 2025-05-22 RX ORDER — PROMETHAZINE HYDROCHLORIDE 12.5 MG/1
12.5 TABLET ORAL ONCE AS NEEDED
Status: DISCONTINUED | OUTPATIENT
Start: 2025-05-22 | End: 2025-05-22 | Stop reason: HOSPADM

## 2025-05-22 RX ORDER — OXYCODONE HYDROCHLORIDE 5 MG/1
5 TABLET ORAL
Status: DISCONTINUED | OUTPATIENT
Start: 2025-05-22 | End: 2025-05-22 | Stop reason: HOSPADM

## 2025-05-22 RX ORDER — PROMETHAZINE HYDROCHLORIDE 25 MG/1
25 TABLET ORAL ONCE AS NEEDED
Status: DISCONTINUED | OUTPATIENT
Start: 2025-05-22 | End: 2025-05-22 | Stop reason: HOSPADM

## 2025-05-22 RX ORDER — ACETAMINOPHEN 325 MG/1
650 TABLET ORAL ONCE
Status: DISCONTINUED | OUTPATIENT
Start: 2025-05-22 | End: 2025-05-22 | Stop reason: HOSPADM

## 2025-05-22 RX ORDER — ONDANSETRON 2 MG/ML
4 INJECTION INTRAMUSCULAR; INTRAVENOUS ONCE AS NEEDED
Status: DISCONTINUED | OUTPATIENT
Start: 2025-05-22 | End: 2025-05-22 | Stop reason: HOSPADM

## 2025-05-22 RX ORDER — IBUPROFEN 600 MG/1
600 TABLET, FILM COATED ORAL EVERY 6 HOURS PRN
Status: DISCONTINUED | OUTPATIENT
Start: 2025-05-22 | End: 2025-05-22 | Stop reason: HOSPADM

## 2025-05-22 RX ORDER — ACETAMINOPHEN 500 MG
500 TABLET ORAL ONCE
Status: COMPLETED | OUTPATIENT
Start: 2025-05-22 | End: 2025-05-22

## 2025-05-22 RX ORDER — PROMETHAZINE HYDROCHLORIDE 25 MG/1
25 SUPPOSITORY RECTAL ONCE AS NEEDED
Status: DISCONTINUED | OUTPATIENT
Start: 2025-05-22 | End: 2025-05-22 | Stop reason: HOSPADM

## 2025-05-22 RX ADMIN — PROPOFOL 70 MG: 10 INJECTION, EMULSION INTRAVENOUS at 08:19

## 2025-05-22 RX ADMIN — SODIUM CHLORIDE, POTASSIUM CHLORIDE, SODIUM LACTATE AND CALCIUM CHLORIDE 9 ML/HR: 600; 310; 30; 20 INJECTION, SOLUTION INTRAVENOUS at 07:40

## 2025-05-22 RX ADMIN — LIDOCAINE HYDROCHLORIDE 60 MG: 20 INJECTION, SOLUTION INTRAVENOUS at 08:19

## 2025-05-22 RX ADMIN — PROPOFOL 50 MG: 10 INJECTION, EMULSION INTRAVENOUS at 08:24

## 2025-05-22 RX ADMIN — SODIUM CHLORIDE 2 G: 9 INJECTION, SOLUTION INTRAVENOUS at 08:21

## 2025-05-22 RX ADMIN — ACETAMINOPHEN 500 MG: 500 TABLET ORAL at 07:51

## 2025-05-22 RX ADMIN — MIDAZOLAM HYDROCHLORIDE 2 MG: 1 INJECTION, SOLUTION INTRAMUSCULAR; INTRAVENOUS at 07:59

## 2025-05-22 RX ADMIN — PROPOFOL 200 MCG/KG/MIN: 10 INJECTION, EMULSION INTRAVENOUS at 08:19

## 2025-05-22 NOTE — ANESTHESIA POSTPROCEDURE EVALUATION
Patient: Rivera Ku    Procedure Summary       Date: 05/22/25 Room / Location: [facility] OR 09 / [facility] MAIN OR    Anesthesia Start: 0814 Anesthesia Stop: 0837    Procedure: PROSTATE ULTRASOUND BIOPSY MRI FUSION WITH URONAV  Ultrasound of the prostate through the rectum with biopsies of the prostate using MRI images Diagnosis:       Prostate cancer      (Prostate cancer [C61])    Surgeons: Ashley Sosa MD Provider: Elvis Guzman MD    Anesthesia Type: general, MAC ASA Status: 2            Anesthesia Type: general, MAC    Vitals  Vitals Value Taken Time   /79 05/22/25 09:05   Temp 36.3 °C (97.3 °F) 05/22/25 08:57   Pulse 91 05/22/25 09:05   Resp 15 05/22/25 09:05   SpO2 96 % 05/22/25 09:05           Post Anesthesia Care and Evaluation    Patient location during evaluation: bedside  Patient participation: complete - patient participated  Level of consciousness: awake  Pain management: adequate    Airway patency: patent  PONV Status: none  Cardiovascular status: acceptable and stable  Respiratory status: acceptable  Hydration status: acceptable

## 2025-05-22 NOTE — ANESTHESIA PREPROCEDURE EVALUATION
" Anesthesia Evaluation     Patient summary reviewed and Nursing notes reviewed   no history of anesthetic complications:   NPO Solid Status: > 8 hours  NPO Liquid Status: > 2 hours           Airway   Mallampati: I  TM distance: >3 FB  Neck ROM: full  Dental - normal exam     Pulmonary - normal exam   (+) a smoker Former,  Cardiovascular - normal exam  Exercise tolerance: good (4-7 METS)    (+) hyperlipidemia    ROS comment: Complete Transthoracic Echocardiogram with Complete Doppler and Color Flow    Patient Name: Rivera Ku  Patient MRN: [id number]  Patient : 1965 (57 y.o.)  Legal Sex: Male     Accession Number: [id number]  Date of Study: 22  Ordering Provider: Ever Todd MD   Clinical Indications: Palpitations     Reading Physicians  Performing Staff  Cardiology: Giovanni Bernardo MD     Tech: Adrinae Liao       Patient Hx Of Height, Weight, and Vitals    Height Weight BSA (Calculated - sq m) BMI (Calculated) Retired BMI (kg/m2) Pulse BP  152.4 cm (60\") 59.4 kg (131 lb) 1.56 sq meters 25.6   136/72    PACS Images     Show images for Adult Transthoracic Echo Complete W/ Cont if Necessary Per Protocol [facility] PACS Images     Show images for Adult Transthoracic Echo Complete W/ Cont if Necessary Per Protocol  Clinical Indication    Palpitations  Dx: Palpitations [R00.2 (ICD-10-CM)]    Interpretation Summary    Normal left ventricular systolic function with an estimated ejection fraction of 65%.  No regional wall motion abnormalities were observed.  Left ventricular diastolic function was indeterminate.  Mild mitral regurgitation and mild tricuspid regurgitation, but no hemodynamically significant valvular pathology.  Estimated right ventricular systolic pressure was within normal limits.  No evidence of pericardial effusion.      Neuro/Psych  (+) headaches  GI/Hepatic/Renal/Endo    (+) GERD    Musculoskeletal (-) negative ROS    Abdominal  - normal exam   Substance History - negative " use     OB/GYN negative ob/gyn ROS         Other      history of cancer    ROS/Med Hx Other: PAT Nursing Notes unavailable.                 Anesthesia Plan    ASA 2     general and MAC   total IV anesthesia  intravenous induction     Anesthetic plan, risks, benefits, and alternatives have been provided, discussed and informed consent has been obtained with: patient.    Plan discussed with CRNA.      CODE STATUS:          Cartilage Graft Text: Given the location, size, depth of the defect, and its propinquity to the alar rim, a free cartilage graft was deemed the most appropriate reconstructive option.  An appropriate donor site was identified, cleansed, and anesthetized. The cartilage graft was then harvested with periosteum intact and transferred to the recipient site and oriented appropriately within the wound and prepared pockets. A single polypropylene suture was placed through and through the cartilage graft and the subtending wound bed.  The perichondrium and wound edges were apposed using additional polypropylene sutures. The cartilage graft was then multiply perforated using a 30g needle to facilitate graft vascularization.  The secondary defect was then repaired using a simple closure.  Reconstruction was considered complete.

## 2025-05-22 NOTE — H&P
[facility]   Urology HISTORY AND PHYSICAL    Patient Name: Rivera Ku  : 1965  MRN: [id number]  Primary Care Physician:  Wesley Celaya MD  Date of admission: 2025    Subjective   Subjective     History of Present Illness  Patient has prostate cancer and is on active surveillance and presents for MRI fusion transrectal ultrasound and biopsy of the prostate      Personal History     Past Medical History:   Diagnosis Date    Elevated cholesterol     Elevated PSA     GERD (gastroesophageal reflux disease)     Hyperlipidemia     Ocular migraine 2023       Past Surgical History:   Procedure Laterality Date    COLONOSCOPY      COLONOSCOPY N/A 2024    Procedure: COLONOSCOPY;  Surgeon: Paul Herrera MD;  Location: [facility] ENDOSCOPY;  Service: General;  Laterality: N/A;  NORMAL    PROSTATE BIOPSY N/A 2024    Procedure: MAGNETIC RESONANCE IMAGING FUSION TRANSRECTAL ULTRASOUND AND BIOPSY OF THE PROSTATE;  Surgeon: Ahsley Sosa MD;  Location: [facility] MAIN OR;  Service: Urology;  Laterality: N/A;       Family History: family history includes Cancer in his father; Hearing loss in his father; Lung cancer in his father. Otherwise pertinent FHx was reviewed and not pertinent to current issue.    Social History:  reports that he quit smoking about 26 years ago. His smoking use included cigarettes. He started smoking about 35 years ago. He has a 4.6 pack-year smoking history. He has been exposed to tobacco smoke. He has quit using smokeless tobacco.  His smokeless tobacco use included chew. He reports that he does not drink alcohol and does not use drugs.    Home Medications:  aspirin, ezetimibe, nortriptyline, pantoprazole, and tamsulosin    Allergies:  Allergies   Allergen Reactions    Crestor [Rosuvastatin] Other (See Comments) and Unknown - Low Severity     Leg weakness and brain fog       Objective    Objective     Vitals:   Temp:  [97.4 °F (36.3 °C)] 97.4 °F (36.3 °C)  Heart  Rate:  [92] 92  Resp:  [16] 16    Physical Exam  Constitutional:       Appearance: Normal appearance.   Cardiovascular:      Rate and Rhythm: Normal rate and regular rhythm.   Pulmonary:      Effort: Pulmonary effort is normal.      Breath sounds: Normal breath sounds.   Neurological:      Mental Status: He is alert. Mental status is at baseline.   Psychiatric:         Mood and Affect: Mood and affect normal.         Speech: Speech normal.         Judgment: Judgment normal.         Result Review    Result Review:  I have personally reviewed the results from the time of this admission to 5/22/2025 08:11 EDT and agree with these findings:  [x]  Laboratory  []  Microbiology  [x]  Radiology  []  EKG/Telemetry   []  Cardiology/Vascular   []  Pathology  [x]  Old records  []  Other:      Assessment & Plan   Assessment / Plan       Active Hospital Problems:  Active Hospital Problems    Diagnosis     **Prostate cancer        Plan: MRI fusion transrectal ultrasound and biopsy of the prostate  Risks and benefits discussed with patient and they are agreeable to proceed.    VTE Prophylaxis:  No VTE prophylaxis order currently exists.        CODE STATUS:           Electronically signed by Ashley Sosa MD, 11/18/24, 9:38 AM EST.

## 2025-05-22 NOTE — OP NOTE
PROSTATE ULTRASOUND BIOPSY MRI FUSION WITH URONAV  Procedure Report    Patient Name:  Rivera Ku  YOB: 1965    Date of Surgery:  5/22/2025     Pre-op Diagnosis:   Prostate cancer [C61]       Post-Op Diagnosis Codes:     * Prostate cancer [C61]      Procedure/CPT® Codes:    Procedure(s):  PROSTATE ULTRASOUND BIOPSY MRI FUSION WITH URONAV        Staff:  Surgeon(s):  Ashley Sosa MD         Anesthesia: Sedation    Estimated Blood Loss: 0 mL    Implants:    Nothing was implanted during the procedure    Specimen:          Specimens       ID Source Type Tests Collected By Collected At Frozen?    A Prostate Tissue TISSUE PATHOLOGY EXAM   Ashley Sosa MD 5/22/25 0822     Description: left prostate biopsy x4    B Prostate Tissue TISSUE PATHOLOGY EXAM   Ashley Sosa MD 5/22/25 0822     Description: right prostate biopsy x4    C Prostate Tissue TISSUE PATHOLOGY EXAM   Ashley Sosa MD 5/22/25 0822     Description: region of interest prostate biopsy x4                Complications: None    Description of Procedure:     An ultrasound probe was placed into the rectum and the prostate was inspected.  No obvious lesions were seen.    The prostate was then imaged and examined using the ultrasound and this was fused with his previous MRI of his prostate using the fusion software.      The right and left sides of the prostate were then biopsied randomly using the biopsy needle.  They were labeled as right and left prostate biopsy.  4 were taken on the left and 4 on the right.     At this point, the biopsies were taken of the region of interest from the prostate.  These were sent to pathology and labeled as region of interest.      The patient tolerated this well.  The ultrasound probe was removed.  He was transferred to the PACU in stable condition.         Ashley Sosa MD     Date: 5/22/2025  Time: 08:45 EDT

## 2025-05-22 NOTE — DISCHARGE INSTRUCTIONS
Discharge Instructions Prostate Biopsy             For your surgery you had:    Monitored anesthesia care      You may experience dizziness, drowsiness, or lightheadedness for several hours following surgery.  Do not stay alone today or tonight.  Limit your activity for 24 hours.  You should not drive, operate machinery, drink alcohol, or sign legally binding documents for 24 hours or while you are taking pain medication.  Resume your diet slowly.  Follow any special dietary instructions you may have been given by your doctor.    NOTIFY YOUR DOCTOR IF YOU EXPERIENCE ANY OF THE FOLLOWING:  Temperature greater than 101 degrees Fahrenheit  Shaking Chills  Redness or excessive drainage from incision  Nausea, vomiting and/or pain that is not controlled by prescribed medications  Increase in bleeding or bleeding that is excessive  Unable to urinate in 6 hours after surgery  If unable to reach your doctor, please go to the closest Emergency Room.   Drink 4-6 glasses of water a day for 3-4 days  You may see blood in your urine for up to a week, blood in your stool for 3-4 days, and blood in semen for up to a month  If you are passing large clots, call your doctor  Avoid lifting or straining for 48 hours  It is normal to experience burning during urination for 48 hours after biopsy  Call your doctor if pain, burning, urgency, or frequency of urination persist beyond 48 hours  Medications per physician as indicated on discharge medication information sheet    SPECIAL INSTRUCTIONS:                      Last dose of pain medication given at:     .

## 2025-05-23 ENCOUNTER — TELEPHONE (OUTPATIENT)
Dept: SURGERY | Facility: CLINIC | Age: 60
End: 2025-05-23
Payer: COMMERCIAL

## 2025-05-25 ENCOUNTER — RESULTS FOLLOW-UP (OUTPATIENT)
Dept: PERIOP | Facility: HOSPITAL | Age: 60
End: 2025-05-25
Payer: COMMERCIAL

## 2025-05-25 DIAGNOSIS — C61 PROSTATE CANCER: Primary | ICD-10-CM

## 2025-05-25 NOTE — PROGRESS NOTES
I called patient and went over the results of his prostate biopsy.  He understands that you will be setting up a CT scan and bone scan.  I put those orders in his chart already.  Please schedule them and call him with those appointments and change his follow-up appointment from 5/28/2025 to after those 2 test are done.  Again he is aware you will be calling him with these new appointments.  Can you please also put him some information in the mail?  Thanks.

## 2025-05-27 ENCOUNTER — TELEPHONE (OUTPATIENT)
Dept: UROLOGY | Age: 60
End: 2025-05-27
Payer: COMMERCIAL

## 2025-05-27 NOTE — TELEPHONE ENCOUNTER
Left message with appointment information for CT/Bone Scan and follow up appointment with Dr. Sosa. I advised to not eat or drink anything for 2 hours prior to CT scan and that the appointment information is in MyChart. I asked patient to call our office if there were any questions.

## 2025-06-05 ENCOUNTER — TELEPHONE (OUTPATIENT)
Dept: UROLOGY | Age: 60
End: 2025-06-05
Payer: COMMERCIAL

## 2025-06-06 ENCOUNTER — HOSPITAL ENCOUNTER (OUTPATIENT)
Dept: NUCLEAR MEDICINE | Facility: HOSPITAL | Age: 60
Discharge: HOME | End: 2025-06-06
Payer: COMMERCIAL

## 2025-06-06 ENCOUNTER — HOSPITAL ENCOUNTER (OUTPATIENT)
Dept: CT IMAGING | Facility: HOSPITAL | Age: 60
Discharge: HOME | End: 2025-06-06
Payer: COMMERCIAL

## 2025-06-06 DIAGNOSIS — C61 PROSTATE CANCER: ICD-10-CM

## 2025-06-06 PROCEDURE — 74178 CT ABD&PLV WO CNTR FLWD CNTR: CPT

## 2025-06-06 PROCEDURE — 34310000005 TECHNETIUM MEDRONATE KIT: Performed by: UROLOGY

## 2025-06-06 PROCEDURE — 25510000001 IOPAMIDOL PER 1 ML: Performed by: UROLOGY

## 2025-06-06 PROCEDURE — 78306 BONE IMAGING WHOLE BODY: CPT

## 2025-06-06 PROCEDURE — A9503 TC99M MEDRONATE: HCPCS | Performed by: UROLOGY

## 2025-06-06 RX ORDER — TC 99M MEDRONATE 20 MG/10ML
23.2 INJECTION, POWDER, LYOPHILIZED, FOR SOLUTION INTRAVENOUS
Status: COMPLETED | OUTPATIENT
Start: 2025-06-06 | End: 2025-06-06

## 2025-06-06 RX ORDER — IOPAMIDOL 755 MG/ML
100 INJECTION, SOLUTION INTRAVASCULAR
Status: COMPLETED | OUTPATIENT
Start: 2025-06-06 | End: 2025-06-06

## 2025-06-06 RX ADMIN — TC 99M MEDRONATE 23.2 MILLICURIE: 20 INJECTION, POWDER, LYOPHILIZED, FOR SOLUTION INTRAVENOUS at 08:45

## 2025-06-06 RX ADMIN — IOPAMIDOL 100 ML: 755 INJECTION, SOLUTION INTRAVENOUS at 08:44

## 2025-06-13 DIAGNOSIS — K21.9 GASTROESOPHAGEAL REFLUX DISEASE WITHOUT ESOPHAGITIS: ICD-10-CM

## 2025-06-13 RX ORDER — PANTOPRAZOLE SODIUM 40 MG/1
40 TABLET, DELAYED RELEASE ORAL DAILY
Qty: 90 TABLET | Refills: 0 | Status: SHIPPED | OUTPATIENT
Start: 2025-06-13

## 2025-06-27 ENCOUNTER — OFFICE VISIT (OUTPATIENT)
Dept: UROLOGY | Age: 60
End: 2025-06-27
Payer: COMMERCIAL

## 2025-06-27 VITALS — WEIGHT: 135 LBS | HEIGHT: 62 IN | BODY MASS INDEX: 24.84 KG/M2

## 2025-06-27 DIAGNOSIS — C61 PROSTATE CANCER: Primary | ICD-10-CM

## 2025-06-27 LAB
BILIRUB BLD-MCNC: NEGATIVE MG/DL
CLARITY, POC: CLEAR
COLOR UR: YELLOW
EXPIRATION DATE: ABNORMAL
GLUCOSE UR STRIP-MCNC: NEGATIVE MG/DL
KETONES UR QL: NEGATIVE
LEUKOCYTE EST, POC: ABNORMAL
Lab: ABNORMAL
NITRITE UR-MCNC: NEGATIVE MG/ML
PH UR: 6 [PH] (ref 5–8)
PROT UR STRIP-MCNC: ABNORMAL MG/DL
RBC # UR STRIP: NEGATIVE /UL
SP GR UR: 1.02 (ref 1–1.03)
UROBILINOGEN UR QL: ABNORMAL

## 2025-06-27 NOTE — PROGRESS NOTES
"Chief Complaint  Prostate Cancer    Subjective            Rivera Ku presents to [facility] UROLOGY    History of Present Illness  The patient presents for evaluation of prostate cancer.    He has been proactive in his approach, having read through the available literature on his condition. He is currently contemplating the most suitable treatment option for his prostate cancer, whether it be surgical intervention or radiation therapy. He has expressed a desire to regain his full strength by 09/2025.       Objective   Vital Signs:   Ht 157.5 cm (62\")   Wt 61.2 kg (135 lb)   BMI 24.69 kg/m²       Physical Exam  Vitals and nursing note reviewed.   Constitutional:       Appearance: Normal appearance. He is well-developed.   Pulmonary:      Effort: Pulmonary effort is normal.      Breath sounds: Normal air entry.   Neurological:      Mental Status: He is alert and oriented to person, place, and time.      Motor: Motor function is intact.   Psychiatric:         Mood and Affect: Mood normal.         Behavior: Behavior normal.          Result Review :   The following data was reviewed by: Ashley Sosa MD on 06/27/2025:    Results for orders placed or performed in visit on 06/27/25   POC Urinalysis Dipstick, Automated    Collection Time: 06/27/25 12:55 PM    Specimen: Urine   Result Value Ref Range    Color Yellow Yellow, Straw, Dark Yellow, Christina    Clarity, UA Clear Clear    Specific Gravity  1.025 1.005 - 1.030    pH, Urine 6.0 5.0 - 8.0    Leukocytes Small (1+) (A) Negative    Nitrite, UA Negative Negative    Protein, POC 30 mg/dL (A) Negative mg/dL    Glucose, UA Negative Negative mg/dL    Ketones, UA Negative Negative    Urobilinogen, UA 0.2 E.U./dL Normal, 0.2 E.U./dL    Bilirubin Negative Negative    Blood, UA Negative Negative    Lot Number [id number]     Expiration Date 42,026      PSA          11/14/2024    07:39   PSA   PSA 3.890        Results  Imaging   - Scans of the prostate: No " evidence of anything outside of the prostate        Results    Procedure Component Value Ref Range Date/Time   CT Abdomen Pelvis With & Without Contrast [[id number]] Collected: 06/09/25 1543   Order Status: Completed Updated: 06/09/25 1549   Narrative:     CT ABDOMEN PELVIS W WO CONTRAST    Date of Exam: 6/6/2025 8:15 AM EDT    Indication: Prostate cancer.    Comparison: None available.    Technique: Axial CT images were obtained of the abdomen and pelvis before and after the uneventful intravenous administration of iodinated contrast. Sagittal and coronal reconstructions were performed.  Automated exposure control and iterative  reconstruction methods were used.      Findings:    Lower Chest: Lung bases clear    Kidneys/collecting systems/bladder: No renal or ureteral calculi. No hydronephrosis. Symmetric normal renal enhancement subcentimeter right renal cyst. No enhancing renal mass. Symmetric excretion of contrast with normal pelvicalyceal morphology. No  suspicious filling defect or urothelial thickening of the opacified portion of either renal collecting system. No bladder calculus. No focal bladder abnormality suggested    Extrarenal organs: Liver, spleen, pancreas, adrenal glands, gallbladder unremarkable    Gastrointestinal: No abnormality demonstrated. Normal appendix    Pelvis: Enhancement of the central prostate gland and prostatic calcification noted. No abnormal fluid collection. No adenopathy    Peritoneum/Retroperitoneum: No retroperitoneal adenopathy. No ascites or peritoneal tumor. Normal caliber aorta    Bones/Soft Tissues: No suspicious bone lesion   Impression:     No evidence of metastatic disease            Electronically Signed: Alex Toney   6/9/2025 3:47 PM EDT   Workstation ID: [id number]       Results    Procedure Component Value Ref Range Date/Time   NM Bone Scan Whole Body [[id number]] Collected: 06/09/25 1643   Order Status: Completed Updated: 06/09/25 1654   Narrative:     DATE OF EXAM:  6/6/2025 8:52 AM EDT    PROCEDURE: NM BONE SCAN WHOLE BODY    INDICATIONS: Prostate cancer    COMPARISON: CT abdomen pelvis 6/6/2025    TECHNIQUE: The patient received 23.20 mCi of technetium 99m MDP intravenously and 2 hour delayed anterior and posterior whole body bone images were obtained.    FINDINGS:  There are no foci of suspicious bone turnover. There is a focus of increased activity at the neck of the right 10th rib that correlates with costotransverse osteoarthritis on the comparison CT abdomen/pelvis. Activity is present in both kidneys and in  the urinary bladder. There is no abnormal soft tissue uptake   Impression:     No scintigraphic evidence of skeletal metastatic disease      Electronically Signed: Alex Powellnes   6/9/2025 4:51 PM EDT   Workstation ID: [id number]            Assessment and Plan    Diagnoses and all orders for this visit:    1. Prostate cancer (Primary)  -     POC Urinalysis Dipstick, Automated      Assessment & Plan  1. Prostate cancer.  The patient's prostate cancer is currently confined to the prostate gland, as evidenced by recent scans. The Seble score has escalated from 6 to 7 over the past year, indicating a progression in the disease. A comprehensive discussion was held regarding the potential benefits and drawbacks of both surgical intervention and radiation therapy. The patient was informed that the cure rate is slightly higher with surgery due to the physical removal of the prostate, which allows for radiation to be used as a backup treatment if necessary. The patient was also educated about the side effects of radiation, including damage to surrounding tissues such as the bladder, urethra, nerves controlling erections, rectum, and bowel. Specific side effects of radiation discussed include constipation, diarrhea, loose stools, bleeding, delayed erectile dysfunction, and potential incontinence. The patient was informed that the side effects of surgery, including immediate  problems with erections and incontinence, tend to improve over time. Surgery involves robotic-assisted removal of the prostate, with a typical recovery period of one month for modified activity and a catheter in place for one week. The patient was advised that surgery could be scheduled as early as 07/15/2025, but he has until Monday to make a final decision. If he opts for radiation therapy, a referral to Dr. Orta will be arranged.          Follow Up       No follow-ups on file.  Patient was given instructions and counseling regarding his condition or for health maintenance advice. Please see specific information pulled into the AVS if appropriate.     Transcribed from ambient dictation for Ashley Sosa MD by Ashley Sosa MD.  06/27/25   12:59 EDT    Patient or patient representative verbalized consent for the use of Ambient Listening during the visit with  Ashley Ssoa MD for chart documentation. 6/27/2025  12:59 EDT

## 2025-06-30 ENCOUNTER — TELEPHONE (OUTPATIENT)
Dept: UROLOGY | Age: 60
End: 2025-06-30
Payer: COMMERCIAL

## 2025-06-30 NOTE — TELEPHONE ENCOUNTER
Patient called again to schedule surgery. He wants July 22. That was one of the dates that Dr Sosa gave him.

## 2025-07-01 ENCOUNTER — TELEPHONE (OUTPATIENT)
Dept: UROLOGY | Age: 60
End: 2025-07-01
Payer: COMMERCIAL

## 2025-07-01 ENCOUNTER — PREP FOR SURGERY (OUTPATIENT)
Dept: OTHER | Facility: HOSPITAL | Age: 60
End: 2025-07-01
Payer: COMMERCIAL

## 2025-07-01 DIAGNOSIS — C61 PROSTATE CANCER: Primary | ICD-10-CM

## 2025-07-01 RX ORDER — SODIUM CHLORIDE 0.9 % (FLUSH) 0.9 %
10 SYRINGE (ML) INJECTION EVERY 12 HOURS SCHEDULED
OUTPATIENT
Start: 2025-07-01

## 2025-07-01 RX ORDER — SODIUM CHLORIDE 9 MG/ML
40 INJECTION, SOLUTION INTRAVENOUS AS NEEDED
OUTPATIENT
Start: 2025-07-01

## 2025-07-01 RX ORDER — SODIUM CHLORIDE 0.9 % (FLUSH) 0.9 %
10 SYRINGE (ML) INJECTION AS NEEDED
OUTPATIENT
Start: 2025-07-01

## 2025-07-01 RX ORDER — SODIUM CHLORIDE 0.9 % (FLUSH) 0.9 %
3 SYRINGE (ML) INJECTION EVERY 12 HOURS SCHEDULED
OUTPATIENT
Start: 2025-07-01

## 2025-07-01 NOTE — TELEPHONE ENCOUNTER
Procedure: Robotic Radical Prostatectomy with Bilateral Pelvic Lymph Node Dissection     Med Directive: Aspirin 5 days    PMH: Palpitations, HLD    Last Seen: 4/7/2025

## 2025-07-01 NOTE — TELEPHONE ENCOUNTER
Spoke with patient and scheduled surgery for 7/22/25. Patient is coming in for nurse visit on 7/8/25 at 12:45 for further instructions. Patient voiced understanding.

## 2025-07-01 NOTE — H&P (VIEW-ONLY)
[facility]   Urology HISTORY AND PHYSICAL    Patient Name: Rivera Ku  : 1965  MRN: [id number]  Primary Care Physician:  Wesley Celaya MD  Date of admission: (Not on file)    Subjective   Subjective     History of Present Illness  Patient has prostate cancer presents for robotic radical prostatectomy and bilateral pelvic lymph node dissection      Personal History     Past Medical History:   Diagnosis Date    Elevated cholesterol     Elevated PSA     GERD (gastroesophageal reflux disease)     Hyperlipidemia     Ocular migraine 2023       Past Surgical History:   Procedure Laterality Date    COLONOSCOPY      COLONOSCOPY N/A 2024    Procedure: COLONOSCOPY;  Surgeon: Paul Herrera MD;  Location: [facility] ENDOSCOPY;  Service: General;  Laterality: N/A;  NORMAL    PROSTATE BIOPSY N/A 2024    Procedure: MAGNETIC RESONANCE IMAGING FUSION TRANSRECTAL ULTRASOUND AND BIOPSY OF THE PROSTATE;  Surgeon: Ashley Sosa MD;  Location: [facility] MAIN OR;  Service: Urology;  Laterality: N/A;    PROSTATE BIOPSY N/A 2025    Procedure: PROSTATE ULTRASOUND BIOPSY MRI FUSION WITH URONAV  Ultrasound of the prostate through the rectum with biopsies of the prostate using MRI images;  Surgeon: Ashley Sosa MD;  Location: [facility] OR;  Service: Urology;  Laterality: N/A;       Family History: family history includes Cancer in his father; Hearing loss in his father; Heart disease in his father; Lung cancer in his father. Otherwise pertinent FHx was reviewed and not pertinent to current issue.    Social History:  reports that he quit smoking about 26 years ago. His smoking use included cigarettes. He started smoking about 35 years ago. He has a 4.6 pack-year smoking history. He has been exposed to tobacco smoke. He has quit using smokeless tobacco.  His smokeless tobacco use included chew. He reports that he does not drink alcohol and does not use drugs.    Home Medications:  aspirin,  ezetimibe, nortriptyline, pantoprazole, and tamsulosin    Allergies:  Allergies   Allergen Reactions    Crestor [Rosuvastatin] Other (See Comments) and Unknown - Low Severity     Leg weakness and brain fog       Objective    Objective     Vitals:        Physical Exam  Constitutional:       Appearance: Normal appearance.   Cardiovascular:      Rate and Rhythm: Normal rate and regular rhythm.   Pulmonary:      Effort: Pulmonary effort is normal.      Breath sounds: Normal breath sounds.   Neurological:      Mental Status: He is alert. Mental status is at baseline.   Psychiatric:         Mood and Affect: Mood and affect normal.         Speech: Speech normal.         Judgment: Judgment normal.         Result Review    Result Review:  I have personally reviewed the results from the time of this admission to 7/1/2025 10:00 EDT and agree with these findings:  [x]  Laboratory  []  Microbiology  [x]  Radiology  []  EKG/Telemetry   []  Cardiology/Vascular   [x]  Pathology  [x]  Old records  []  Other:      Assessment & Plan   Assessment / Plan       Active Hospital Problems:  There are no active hospital problems to display for this patient.      Plan: robotic radical prostatectomy and bilateral pelvic lymph node dissection  Risks and benefits discussed with patient and they are agreeable to proceed.    VTE Prophylaxis:  No VTE prophylaxis order currently exists.        CODE STATUS:           Electronically signed by Ashley Sosa MD, 07/01/25, 10:00 AM EDT.

## 2025-07-01 NOTE — TELEPHONE ENCOUNTER
[facility] [place] [facility]  [address] [place] [address]  Fax [phone]  Phone [phone]       07/01/25            To Whom It May Concern:        Our records indicate this patient is currently under anticoagulant therapy Rivera Ku 1965 is to be cleared to hold his aspirin  for 5 days prior to Robotic Radical Prostatectomy with Bilateral Pelvic Lymph Node Dissection on 7/22/25. You may contact our office at [phone] with any questions. I appreciate your prompt response in this matter. Please return this form to our office as soon as possible to [phone]. Thank you for your time and assistance.     [] I approve my patient, Rivera Ku , to stop taking anticoagulant therapy medication 5 days prior to procedure  [] I do NOT approve my patient, Rivera Ku , to stop taking anticoagulant therapy medication at this time.   [] I approve my patient, Rivera Ku from a cardiac standpoint  [] I do NOT approve my patient, Rivera Ku from a cardiac standpoint      Thank you,          Approving physician name (please print): ROSE MARY Nobles    Approving Physician signature: _________________________________   Date: _____________________      Sincerely,     Ashley Sosa MD

## 2025-07-01 NOTE — H&P
[facility]   Urology HISTORY AND PHYSICAL    Patient Name: Rivera Ku  : 1965  MRN: [id number]  Primary Care Physician:  Wesley Celaya MD  Date of admission: (Not on file)    Subjective   Subjective     History of Present Illness  Patient has prostate cancer presents for robotic radical prostatectomy and bilateral pelvic lymph node dissection      Personal History     Past Medical History:   Diagnosis Date    Elevated cholesterol     Elevated PSA     GERD (gastroesophageal reflux disease)     Hyperlipidemia     Ocular migraine 2023       Past Surgical History:   Procedure Laterality Date    COLONOSCOPY      COLONOSCOPY N/A 2024    Procedure: COLONOSCOPY;  Surgeon: Paul Herrera MD;  Location: [facility] ENDOSCOPY;  Service: General;  Laterality: N/A;  NORMAL    PROSTATE BIOPSY N/A 2024    Procedure: MAGNETIC RESONANCE IMAGING FUSION TRANSRECTAL ULTRASOUND AND BIOPSY OF THE PROSTATE;  Surgeon: Ashley Sosa MD;  Location: [facility] MAIN OR;  Service: Urology;  Laterality: N/A;    PROSTATE BIOPSY N/A 2025    Procedure: PROSTATE ULTRASOUND BIOPSY MRI FUSION WITH URONAV  Ultrasound of the prostate through the rectum with biopsies of the prostate using MRI images;  Surgeon: Ashley Sosa MD;  Location: [facility] OR;  Service: Urology;  Laterality: N/A;       Family History: family history includes Cancer in his father; Hearing loss in his father; Heart disease in his father; Lung cancer in his father. Otherwise pertinent FHx was reviewed and not pertinent to current issue.    Social History:  reports that he quit smoking about 26 years ago. His smoking use included cigarettes. He started smoking about 35 years ago. He has a 4.6 pack-year smoking history. He has been exposed to tobacco smoke. He has quit using smokeless tobacco.  His smokeless tobacco use included chew. He reports that he does not drink alcohol and does not use drugs.    Home Medications:  aspirin,  ezetimibe, nortriptyline, pantoprazole, and tamsulosin    Allergies:  Allergies   Allergen Reactions    Crestor [Rosuvastatin] Other (See Comments) and Unknown - Low Severity     Leg weakness and brain fog       Objective    Objective     Vitals:        Physical Exam  Constitutional:       Appearance: Normal appearance.   Cardiovascular:      Rate and Rhythm: Normal rate and regular rhythm.   Pulmonary:      Effort: Pulmonary effort is normal.      Breath sounds: Normal breath sounds.   Neurological:      Mental Status: He is alert. Mental status is at baseline.   Psychiatric:         Mood and Affect: Mood and affect normal.         Speech: Speech normal.         Judgment: Judgment normal.         Result Review    Result Review:  I have personally reviewed the results from the time of this admission to 7/1/2025 10:00 EDT and agree with these findings:  [x]  Laboratory  []  Microbiology  [x]  Radiology  []  EKG/Telemetry   []  Cardiology/Vascular   [x]  Pathology  [x]  Old records  []  Other:      Assessment & Plan   Assessment / Plan       Active Hospital Problems:  There are no active hospital problems to display for this patient.      Plan: robotic radical prostatectomy and bilateral pelvic lymph node dissection  Risks and benefits discussed with patient and they are agreeable to proceed.    VTE Prophylaxis:  No VTE prophylaxis order currently exists.        CODE STATUS:           Electronically signed by Ashley Sosa MD, 07/01/25, 10:00 AM EDT.

## 2025-07-08 ENCOUNTER — CLINICAL SUPPORT (OUTPATIENT)
Dept: UROLOGY | Age: 60
End: 2025-07-08
Payer: COMMERCIAL

## 2025-07-08 ENCOUNTER — HOSPITAL ENCOUNTER (OUTPATIENT)
Facility: HOSPITAL | Age: 60
Discharge: HOME | End: 2025-07-08
Payer: COMMERCIAL

## 2025-07-08 ENCOUNTER — PRE-ADMISSION TESTING (OUTPATIENT)
Dept: PREADMISSION TESTING | Facility: HOSPITAL | Age: 60
End: 2025-07-08
Payer: COMMERCIAL

## 2025-07-08 VITALS
WEIGHT: 136.69 LBS | OXYGEN SATURATION: 94 % | TEMPERATURE: 98.1 F | RESPIRATION RATE: 16 BRPM | HEIGHT: 62 IN | BODY MASS INDEX: 25.15 KG/M2 | HEART RATE: 96 BPM | DIASTOLIC BLOOD PRESSURE: 80 MMHG | SYSTOLIC BLOOD PRESSURE: 126 MMHG

## 2025-07-08 DIAGNOSIS — C61 PROSTATE CANCER: ICD-10-CM

## 2025-07-08 DIAGNOSIS — C61 PROSTATE CANCER: Primary | ICD-10-CM

## 2025-07-08 LAB
ABO GROUP BLD: NORMAL
ALBUMIN SERPL-MCNC: 4.3 G/DL (ref 3.5–5.2)
ALBUMIN/GLOB SERPL: 1.8 G/DL
ALP SERPL-CCNC: 97 U/L (ref 39–117)
ALT SERPL W P-5'-P-CCNC: 24 U/L (ref 1–41)
ANION GAP SERPL CALCULATED.3IONS-SCNC: 10.5 MMOL/L (ref 5–15)
AST SERPL-CCNC: 18 U/L (ref 1–40)
BILIRUB SERPL-MCNC: 0.3 MG/DL (ref 0–1.2)
BUN SERPL-MCNC: 13.3 MG/DL (ref 8–23)
BUN/CREAT SERPL: 11.2 (ref 7–25)
CALCIUM SPEC-SCNC: 8.8 MG/DL (ref 8.6–10.5)
CHLORIDE SERPL-SCNC: 106 MMOL/L (ref 98–107)
CO2 SERPL-SCNC: 24.5 MMOL/L (ref 22–29)
CREAT SERPL-MCNC: 1.19 MG/DL (ref 0.76–1.27)
DEPRECATED RDW RBC AUTO: 42.1 FL (ref 37–54)
EGFRCR SERPLBLD CKD-EPI 2021: 69.5 ML/MIN/1.73
ERYTHROCYTE [DISTWIDTH] IN BLOOD BY AUTOMATED COUNT: 13 % (ref 12.3–15.4)
GLOBULIN UR ELPH-MCNC: 2.4 GM/DL
GLUCOSE SERPL-MCNC: 84 MG/DL (ref 65–99)
HCT VFR BLD AUTO: 42.6 % (ref 37.5–51)
HGB BLD-MCNC: 14.3 G/DL (ref 13–17.7)
MCH RBC QN AUTO: 29.8 PG (ref 26.6–33)
MCHC RBC AUTO-ENTMCNC: 33.6 G/DL (ref 31.5–35.7)
MCV RBC AUTO: 88.8 FL (ref 79–97)
PLATELET # BLD AUTO: 226 10*3/MM3 (ref 140–450)
PMV BLD AUTO: 10.3 FL (ref 6–12)
POTASSIUM SERPL-SCNC: 4.1 MMOL/L (ref 3.5–5.2)
PROT SERPL-MCNC: 6.7 G/DL (ref 6–8.5)
RBC # BLD AUTO: 4.8 10*6/MM3 (ref 4.14–5.8)
RH BLD: POSITIVE
SODIUM SERPL-SCNC: 141 MMOL/L (ref 136–145)
WBC NRBC COR # BLD AUTO: 6.03 10*3/MM3 (ref 3.4–10.8)

## 2025-07-08 PROCEDURE — 85027 COMPLETE CBC AUTOMATED: CPT

## 2025-07-08 PROCEDURE — 86900 BLOOD TYPING SEROLOGIC ABO: CPT

## 2025-07-08 PROCEDURE — 86901 BLOOD TYPING SEROLOGIC RH(D): CPT

## 2025-07-08 PROCEDURE — 36415 COLL VENOUS BLD VENIPUNCTURE: CPT

## 2025-07-08 PROCEDURE — 80053 COMPREHEN METABOLIC PANEL: CPT

## 2025-07-08 PROCEDURE — 71046 X-RAY EXAM CHEST 2 VIEWS: CPT

## 2025-07-08 NOTE — PROGRESS NOTES
I went over preop instructions for surgery and gave the appointment dates/times to patient. Answered questions about surgery and advised patient to call office or message through [facility] [employer]. Patient voiced understanding.

## 2025-07-08 NOTE — DISCHARGE INSTRUCTIONS
IMPORTANT INSTRUCTIONS - PRE-ADMISSION TESTING    MEDICATIONS TO HOLD:  ASPIRIN, NDAIDS LAST DOSE 7/14/25  CLEAR LIQUID DIET AS DIRECTED PER DR. DRUMMOND  MIRALAX 1 CAPFUL EVERY MORNING ( 7/19-21/25 AS DIRECTED PER DR. DRUMMOND)    DO NOT EAT OR CHEW anything after midnight 7/20/25    You may have CLEAR liquids  7/21/25 AND up to __2____ hours prior to ARRIVAL time ON 7/22/25.   Take the following medications the morning of your procedure with JUST A SIP OF WATER:  __ZETIA, PROTONIX  TAMSULOSIN_____________________________________________________________________________________________________________________________________________________________________________________    DO NOT BRING your medications to the hospital with you, UNLESS something has changed since your PRE-Admission Testing appointment.  Hold all vitamins, supplements, and NSAIDS (Non- steroidal anti-inflammatory meds) for one week prior to surgery (you MAY take Tylenol or Acetaminophen).  If you are diabetic, check your blood sugar the morning of your procedure. If it is less than 70 or if you are feeling symptomatic, call the following number for further instructions: [phone] SAME DAY SURGERY WILL CALL ARRIVAL TIME 7/21/25 BY 4 P.M._______.  Use your inhalers/nebulizers as usual, the morning of your procedure. BRING YOUR INHALERS with you. NA  Bring your CPAP or BIPAP to hospital, ONLY IF YOU WILL BE SPENDING THE NIGHT. NA  Make sure you have a ride home and have someone who will stay with you the day of your procedure after you go home.  If you have any questions, please call your Pre-Admission Testing Nurse, ____BIRD____________ at [phone] _[phone]___________.   Per anesthesia request, do not smoke for 24 hours before your procedure or as instructed by your surgeon.  NA  SHOWER AS DIRECTED WITH SURGICAL SOAP    Clear Liquid Diet        Find out when you need to start a clear liquid diet.   Think of “clear liquids” as anything you could read a  newspaper through. This includes things like water, broth, sports drinks, or tea WITHOUT any kind of milk or cream.           Once you are told to start a clear liquid diet, only drink these things until 2 hours before arrival to the hospital or when the hospital says to stop. Total volume limitation: 8 oz.       Clear liquids you CAN drink:   Water   Clear broth: beef, chicken, vegetable, or bone broth with nothing in it   Gatorade   Lemonade or Gomez-aid   Soda   Tea, coffee (NO cream or honey)   Jell-O (without fruit)   Popsicles (without fruit or cream)   Italian ices   Juice without pulp: apple, white, grape   You may use salt, pepper, and sugar    Do NOT drink:   Milk or cream   Soy milk, almond milk, coconut milk, or other non-dairy drinks and   creamers   Milkshakes or smoothies   Tomato juice   Orange juice   Grapefruit juice   Cream soups or any other than broth         Clear Liquid Diet:  Do NOT eat any solid food.  Do NOT eat or suck on mints or candy.  Do NOT chew gum.  Do NOT drink thick liquids like milk or juice with pulp in it.  Do NOT add milk, cream, or anything like soy milk or almond milk to coffee or tea.     PREOPERATIVE (BEFORE SURGERY)              BATHING INSTRUCTIONS  Instructions:    You will need to shower 1X utilizing the soap provided; at the times indicated   below:     - AM THE DAY OF SURGERY          Wash your hair and face with normal shampoo and soap, rinse it well before using the surgical soap.      In the shower, wet the skin completely with water from your neck to your feet. Apply the cleanser to your   body ONLY FROM THE NECK TO YOUR FEET.     Do NOT USE THE CLEANSER ON YOUR FACE, HEAD, OR GENITAL (PRIVATE) AREAS.   Keep it out of your eyes, ears, and mouth because of the risk of injury to those areas.      Scrub with a clean washcloth for each bath utilizing the soap provided from the top of your body to the   bottom starting at the neck area.      Pay close attention to your  armpits, groin area, and the site of surgery.      Wash your body gently for 5 minutes. Stand outside the stream or turn off the water while scrubbing your   body. Do NOT wash with your regular soap after the surgical cleanser is used.      RINSE THE CLEANSER OFF COMPLETELY with plenty of water. Rinse the area again thoroughly.      Dry off with a clean towel. The surgical soap can cause dryness; however do NOT APPLY LOTION,   CREAM, POWDER, and/or DEODORANT AFTER SHOWERING.     Be sure to where clean clothes after showering.      Ensure CLEAN BED LINENS AFTER FIRST wash with the surgical soap.      NO PETS ALLOWED IN THE BED with you after utilizing the surgical soap.

## 2025-07-09 ENCOUNTER — ANESTHESIA EVENT (OUTPATIENT)
Dept: PERIOP | Facility: HOSPITAL | Age: 60
End: 2025-07-09
Payer: COMMERCIAL

## 2025-07-22 ENCOUNTER — PREP FOR SURGERY (OUTPATIENT)
Dept: OTHER | Facility: HOSPITAL | Age: 60
End: 2025-07-22
Payer: COMMERCIAL

## 2025-07-22 ENCOUNTER — ANESTHESIA (OUTPATIENT)
Dept: PERIOP | Facility: HOSPITAL | Age: 60
End: 2025-07-22
Payer: COMMERCIAL

## 2025-07-22 ENCOUNTER — HOSPITAL ENCOUNTER (OUTPATIENT)
Facility: HOSPITAL | Age: 60
Setting detail: OBSERVATION
Discharge: HOME | End: 2025-07-23
Attending: UROLOGY | Admitting: UROLOGY
Payer: COMMERCIAL

## 2025-07-22 DIAGNOSIS — C61 PROSTATE CANCER: ICD-10-CM

## 2025-07-22 DIAGNOSIS — C61 PROSTATE CANCER: Primary | ICD-10-CM

## 2025-07-22 LAB
ABO GROUP BLD: NORMAL
BLD GP AB SCN SERPL QL: NEGATIVE
RH BLD: POSITIVE
T&S EXPIRATION DATE: NORMAL

## 2025-07-22 PROCEDURE — 55866 LAPS SURG PRST8ECT RPBIC RAD: CPT | Performed by: UROLOGY

## 2025-07-22 PROCEDURE — 25010000002 PROPOFOL 10 MG/ML EMULSION: Performed by: NURSE ANESTHETIST, CERTIFIED REGISTERED

## 2025-07-22 PROCEDURE — 86850 RBC ANTIBODY SCREEN: CPT | Performed by: UROLOGY

## 2025-07-22 PROCEDURE — 88307 TISSUE EXAM BY PATHOLOGIST: CPT | Performed by: UROLOGY

## 2025-07-22 PROCEDURE — 25010000002 FENTANYL CITRATE (PF) 50 MCG/ML SOLUTION: Performed by: NURSE ANESTHETIST, CERTIFIED REGISTERED

## 2025-07-22 PROCEDURE — 25010000002 BUPIVACAINE (PF) 0.5 % SOLUTION: Performed by: UROLOGY

## 2025-07-22 PROCEDURE — 25810000003 SODIUM CHLORIDE 0.9 % SOLUTION: Performed by: NURSE ANESTHETIST, CERTIFIED REGISTERED

## 2025-07-22 PROCEDURE — 25010000002 GLYCOPYRROLATE 0.2 MG/ML SOLUTION: Performed by: ANESTHESIOLOGY

## 2025-07-22 PROCEDURE — 25010000002 CEFAZOLIN PER 500 MG: Performed by: UROLOGY

## 2025-07-22 PROCEDURE — 25010000002 DEXAMETHASONE PER 1 MG: Performed by: NURSE ANESTHETIST, CERTIFIED REGISTERED

## 2025-07-22 PROCEDURE — 86901 BLOOD TYPING SEROLOGIC RH(D): CPT | Performed by: UROLOGY

## 2025-07-22 PROCEDURE — 25010000002 LIDOCAINE PF 2% 2 % SOLUTION: Performed by: NURSE ANESTHETIST, CERTIFIED REGISTERED

## 2025-07-22 PROCEDURE — 25010000002 HYDROMORPHONE 1 MG/ML SOLUTION: Performed by: NURSE ANESTHETIST, CERTIFIED REGISTERED

## 2025-07-22 PROCEDURE — 86900 BLOOD TYPING SEROLOGIC ABO: CPT | Performed by: UROLOGY

## 2025-07-22 PROCEDURE — 25010000002 KETOROLAC TROMETHAMINE PER 15 MG: Performed by: NURSE ANESTHETIST, CERTIFIED REGISTERED

## 2025-07-22 PROCEDURE — 38571 LAPAROSCOPY LYMPHADENECTOMY: CPT | Performed by: UROLOGY

## 2025-07-22 PROCEDURE — 25010000002 ONDANSETRON PER 1 MG: Performed by: NURSE ANESTHETIST, CERTIFIED REGISTERED

## 2025-07-22 PROCEDURE — 51990 LAPARO URETHRAL SUSPENSION: CPT | Performed by: UROLOGY

## 2025-07-22 PROCEDURE — 88309 TISSUE EXAM BY PATHOLOGIST: CPT | Performed by: UROLOGY

## 2025-07-22 PROCEDURE — G0378 HOSPITAL OBSERVATION PER HR: HCPCS

## 2025-07-22 PROCEDURE — 25810000003 LACTATED RINGERS PER 1000 ML: Performed by: ANESTHESIOLOGY

## 2025-07-22 PROCEDURE — 94761 N-INVAS EAR/PLS OXIMETRY MLT: CPT

## 2025-07-22 PROCEDURE — 25010000002 MIDAZOLAM PER 1MG: Performed by: ANESTHESIOLOGY

## 2025-07-22 PROCEDURE — 94799 UNLISTED PULMONARY SVC/PX: CPT

## 2025-07-22 DEVICE — ABSORBABLE WOUND CLOSURE DEVICE
Type: IMPLANTABLE DEVICE | Site: PELVIS | Status: FUNCTIONAL
Brand: V-LOC 180

## 2025-07-22 DEVICE — ABSORBABLE WOUND CLOSURE DEVICE
Type: IMPLANTABLE DEVICE | Site: PELVIS | Status: FUNCTIONAL
Brand: V-LOC 90

## 2025-07-22 RX ORDER — SODIUM CHLORIDE, SODIUM LACTATE, POTASSIUM CHLORIDE, CALCIUM CHLORIDE 600; 310; 30; 20 MG/100ML; MG/100ML; MG/100ML; MG/100ML
100 INJECTION, SOLUTION INTRAVENOUS CONTINUOUS
Status: ACTIVE | OUTPATIENT
Start: 2025-07-22 | End: 2025-07-23

## 2025-07-22 RX ORDER — SODIUM CHLORIDE 0.9 % (FLUSH) 0.9 %
10 SYRINGE (ML) INJECTION AS NEEDED
Status: CANCELLED | OUTPATIENT
Start: 2025-07-22

## 2025-07-22 RX ORDER — SODIUM CHLORIDE 0.9 % (FLUSH) 0.9 %
3 SYRINGE (ML) INJECTION EVERY 12 HOURS SCHEDULED
Status: CANCELLED | OUTPATIENT
Start: 2025-07-22

## 2025-07-22 RX ORDER — MAGNESIUM HYDROXIDE 1200 MG/15ML
LIQUID ORAL AS NEEDED
Status: DISCONTINUED | OUTPATIENT
Start: 2025-07-22 | End: 2025-07-22 | Stop reason: HOSPADM

## 2025-07-22 RX ORDER — PANTOPRAZOLE SODIUM 40 MG/1
40 TABLET, DELAYED RELEASE ORAL DAILY
Status: DISCONTINUED | OUTPATIENT
Start: 2025-07-23 | End: 2025-07-23 | Stop reason: HOSPADM

## 2025-07-22 RX ORDER — PROPOFOL 10 MG/ML
VIAL (ML) INTRAVENOUS AS NEEDED
Status: DISCONTINUED | OUTPATIENT
Start: 2025-07-22 | End: 2025-07-22 | Stop reason: SURG

## 2025-07-22 RX ORDER — SODIUM CHLORIDE, SODIUM LACTATE, POTASSIUM CHLORIDE, CALCIUM CHLORIDE 600; 310; 30; 20 MG/100ML; MG/100ML; MG/100ML; MG/100ML
9 INJECTION, SOLUTION INTRAVENOUS CONTINUOUS PRN
Status: DISCONTINUED | OUTPATIENT
Start: 2025-07-22 | End: 2025-07-22 | Stop reason: HOSPADM

## 2025-07-22 RX ORDER — KETOROLAC TROMETHAMINE 30 MG/ML
INJECTION, SOLUTION INTRAMUSCULAR; INTRAVENOUS AS NEEDED
Status: DISCONTINUED | OUTPATIENT
Start: 2025-07-22 | End: 2025-07-22 | Stop reason: SURG

## 2025-07-22 RX ORDER — SODIUM CHLORIDE 0.9 % (FLUSH) 0.9 %
10 SYRINGE (ML) INJECTION EVERY 12 HOURS SCHEDULED
Status: CANCELLED | OUTPATIENT
Start: 2025-07-22

## 2025-07-22 RX ORDER — SODIUM CHLORIDE 0.9 % (FLUSH) 0.9 %
3 SYRINGE (ML) INJECTION EVERY 12 HOURS SCHEDULED
Status: DISCONTINUED | OUTPATIENT
Start: 2025-07-22 | End: 2025-07-22 | Stop reason: HOSPADM

## 2025-07-22 RX ORDER — TAMSULOSIN HYDROCHLORIDE 0.4 MG/1
1 CAPSULE ORAL DAILY
COMMUNITY

## 2025-07-22 RX ORDER — SODIUM CHLORIDE 9 MG/ML
40 INJECTION, SOLUTION INTRAVENOUS AS NEEDED
Status: DISCONTINUED | OUTPATIENT
Start: 2025-07-22 | End: 2025-07-23 | Stop reason: HOSPADM

## 2025-07-22 RX ORDER — PROMETHAZINE HYDROCHLORIDE 25 MG/1
12.5 TABLET ORAL EVERY 6 HOURS PRN
Status: DISCONTINUED | OUTPATIENT
Start: 2025-07-22 | End: 2025-07-23 | Stop reason: HOSPADM

## 2025-07-22 RX ORDER — SODIUM CHLORIDE 9 MG/ML
40 INJECTION, SOLUTION INTRAVENOUS AS NEEDED
Status: DISCONTINUED | OUTPATIENT
Start: 2025-07-22 | End: 2025-07-22 | Stop reason: HOSPADM

## 2025-07-22 RX ORDER — SODIUM CHLORIDE 0.9 % (FLUSH) 0.9 %
10 SYRINGE (ML) INJECTION EVERY 12 HOURS SCHEDULED
Status: DISCONTINUED | OUTPATIENT
Start: 2025-07-22 | End: 2025-07-22 | Stop reason: HOSPADM

## 2025-07-22 RX ORDER — FENTANYL CITRATE 50 UG/ML
INJECTION, SOLUTION INTRAMUSCULAR; INTRAVENOUS AS NEEDED
Status: DISCONTINUED | OUTPATIENT
Start: 2025-07-22 | End: 2025-07-22 | Stop reason: SURG

## 2025-07-22 RX ORDER — BUPIVACAINE HYDROCHLORIDE 5 MG/ML
INJECTION, SOLUTION EPIDURAL; INTRACAUDAL; PERINEURAL AS NEEDED
Status: DISCONTINUED | OUTPATIENT
Start: 2025-07-22 | End: 2025-07-22 | Stop reason: HOSPADM

## 2025-07-22 RX ORDER — ONDANSETRON 2 MG/ML
INJECTION INTRAMUSCULAR; INTRAVENOUS AS NEEDED
Status: DISCONTINUED | OUTPATIENT
Start: 2025-07-22 | End: 2025-07-22 | Stop reason: SURG

## 2025-07-22 RX ORDER — ONDANSETRON 2 MG/ML
4 INJECTION INTRAMUSCULAR; INTRAVENOUS ONCE AS NEEDED
Status: DISCONTINUED | OUTPATIENT
Start: 2025-07-22 | End: 2025-07-22 | Stop reason: HOSPADM

## 2025-07-22 RX ORDER — LIDOCAINE HYDROCHLORIDE 20 MG/ML
INJECTION, SOLUTION EPIDURAL; INFILTRATION; INTRACAUDAL; PERINEURAL AS NEEDED
Status: DISCONTINUED | OUTPATIENT
Start: 2025-07-22 | End: 2025-07-22 | Stop reason: SURG

## 2025-07-22 RX ORDER — ACETAMINOPHEN 500 MG
1000 TABLET ORAL ONCE
Status: COMPLETED | OUTPATIENT
Start: 2025-07-22 | End: 2025-07-22

## 2025-07-22 RX ORDER — SODIUM CHLORIDE 0.9 % (FLUSH) 0.9 %
10 SYRINGE (ML) INJECTION AS NEEDED
Status: DISCONTINUED | OUTPATIENT
Start: 2025-07-22 | End: 2025-07-22 | Stop reason: HOSPADM

## 2025-07-22 RX ORDER — DEXAMETHASONE SODIUM PHOSPHATE 4 MG/ML
INJECTION, SOLUTION INTRA-ARTICULAR; INTRALESIONAL; INTRAMUSCULAR; INTRAVENOUS; SOFT TISSUE AS NEEDED
Status: DISCONTINUED | OUTPATIENT
Start: 2025-07-22 | End: 2025-07-22 | Stop reason: SURG

## 2025-07-22 RX ORDER — PROMETHAZINE HYDROCHLORIDE 25 MG/1
25 TABLET ORAL ONCE AS NEEDED
Status: DISCONTINUED | OUTPATIENT
Start: 2025-07-22 | End: 2025-07-22 | Stop reason: HOSPADM

## 2025-07-22 RX ORDER — NALOXONE HCL 0.4 MG/ML
0.1 VIAL (ML) INJECTION
Status: DISCONTINUED | OUTPATIENT
Start: 2025-07-22 | End: 2025-07-23 | Stop reason: HOSPADM

## 2025-07-22 RX ORDER — ROCURONIUM BROMIDE 10 MG/ML
INJECTION, SOLUTION INTRAVENOUS AS NEEDED
Status: DISCONTINUED | OUTPATIENT
Start: 2025-07-22 | End: 2025-07-22 | Stop reason: SURG

## 2025-07-22 RX ORDER — SODIUM CHLORIDE 9 MG/ML
INJECTION, SOLUTION INTRAVENOUS CONTINUOUS PRN
Status: DISCONTINUED | OUTPATIENT
Start: 2025-07-22 | End: 2025-07-22 | Stop reason: SURG

## 2025-07-22 RX ORDER — SODIUM CHLORIDE 0.9 % (FLUSH) 0.9 %
10 SYRINGE (ML) INJECTION AS NEEDED
Status: DISCONTINUED | OUTPATIENT
Start: 2025-07-22 | End: 2025-07-23 | Stop reason: HOSPADM

## 2025-07-22 RX ORDER — PROMETHAZINE HYDROCHLORIDE 25 MG/1
25 SUPPOSITORY RECTAL ONCE AS NEEDED
Status: DISCONTINUED | OUTPATIENT
Start: 2025-07-22 | End: 2025-07-22 | Stop reason: HOSPADM

## 2025-07-22 RX ORDER — AMOXICILLIN 250 MG
2 CAPSULE ORAL 2 TIMES DAILY
Status: DISCONTINUED | OUTPATIENT
Start: 2025-07-22 | End: 2025-07-23 | Stop reason: HOSPADM

## 2025-07-22 RX ORDER — GLYCOPYRROLATE 0.2 MG/ML
0.2 INJECTION INTRAMUSCULAR; INTRAVENOUS ONCE
Status: COMPLETED | OUTPATIENT
Start: 2025-07-22 | End: 2025-07-22

## 2025-07-22 RX ORDER — ONDANSETRON 4 MG/1
4 TABLET, ORALLY DISINTEGRATING ORAL EVERY 6 HOURS PRN
Status: DISCONTINUED | OUTPATIENT
Start: 2025-07-22 | End: 2025-07-23 | Stop reason: HOSPADM

## 2025-07-22 RX ORDER — ONDANSETRON 2 MG/ML
4 INJECTION INTRAMUSCULAR; INTRAVENOUS EVERY 6 HOURS PRN
Status: DISCONTINUED | OUTPATIENT
Start: 2025-07-22 | End: 2025-07-23 | Stop reason: HOSPADM

## 2025-07-22 RX ORDER — SODIUM CHLORIDE 0.9 % (FLUSH) 0.9 %
3 SYRINGE (ML) INJECTION EVERY 12 HOURS SCHEDULED
Status: DISCONTINUED | OUTPATIENT
Start: 2025-07-22 | End: 2025-07-23 | Stop reason: HOSPADM

## 2025-07-22 RX ORDER — SODIUM CHLORIDE 9 MG/ML
40 INJECTION, SOLUTION INTRAVENOUS AS NEEDED
Status: CANCELLED | OUTPATIENT
Start: 2025-07-22

## 2025-07-22 RX ORDER — OXYCODONE AND ACETAMINOPHEN 5; 325 MG/1; MG/1
2 TABLET ORAL EVERY 4 HOURS PRN
Status: DISCONTINUED | OUTPATIENT
Start: 2025-07-22 | End: 2025-07-23 | Stop reason: HOSPADM

## 2025-07-22 RX ORDER — HEPARIN SODIUM 5000 [USP'U]/ML
5000 INJECTION, SOLUTION INTRAVENOUS; SUBCUTANEOUS EVERY 12 HOURS SCHEDULED
Status: DISCONTINUED | OUTPATIENT
Start: 2025-07-23 | End: 2025-07-23 | Stop reason: HOSPADM

## 2025-07-22 RX ORDER — OXYCODONE HYDROCHLORIDE 5 MG/1
5 TABLET ORAL
Status: DISCONTINUED | OUTPATIENT
Start: 2025-07-22 | End: 2025-07-22 | Stop reason: HOSPADM

## 2025-07-22 RX ORDER — NORTRIPTYLINE HYDROCHLORIDE 25 MG/1
25 CAPSULE ORAL NIGHTLY
Status: DISCONTINUED | OUTPATIENT
Start: 2025-07-22 | End: 2025-07-23 | Stop reason: HOSPADM

## 2025-07-22 RX ORDER — SODIUM CHLORIDE 0.9 % (FLUSH) 0.9 %
10 SYRINGE (ML) INJECTION EVERY 12 HOURS SCHEDULED
Status: DISCONTINUED | OUTPATIENT
Start: 2025-07-22 | End: 2025-07-23 | Stop reason: HOSPADM

## 2025-07-22 RX ORDER — MIDAZOLAM HYDROCHLORIDE 2 MG/2ML
2 INJECTION, SOLUTION INTRAMUSCULAR; INTRAVENOUS ONCE
Status: COMPLETED | OUTPATIENT
Start: 2025-07-22 | End: 2025-07-22

## 2025-07-22 RX ADMIN — FENTANYL CITRATE 100 MCG: 50 INJECTION, SOLUTION INTRAMUSCULAR; INTRAVENOUS at 08:54

## 2025-07-22 RX ADMIN — MIDAZOLAM HYDROCHLORIDE 2 MG: 1 INJECTION, SOLUTION INTRAMUSCULAR; INTRAVENOUS at 08:14

## 2025-07-22 RX ADMIN — ACETAMINOPHEN 1000 MG: 500 TABLET ORAL at 08:10

## 2025-07-22 RX ADMIN — ROCURONIUM BROMIDE 30 MG: 10 INJECTION, SOLUTION INTRAVENOUS at 09:36

## 2025-07-22 RX ADMIN — Medication 10 ML: at 21:24

## 2025-07-22 RX ADMIN — Medication 3 ML: at 22:16

## 2025-07-22 RX ADMIN — PROPOFOL 150 MG: 10 INJECTION, EMULSION INTRAVENOUS at 08:54

## 2025-07-22 RX ADMIN — KETOROLAC TROMETHAMINE 15 MG: 30 INJECTION, SOLUTION INTRAMUSCULAR; INTRAVENOUS at 11:29

## 2025-07-22 RX ADMIN — HYDROMORPHONE HYDROCHLORIDE 0.5 MG: 1 INJECTION, SOLUTION INTRAMUSCULAR; INTRAVENOUS; SUBCUTANEOUS at 11:38

## 2025-07-22 RX ADMIN — NORTRIPTYLINE HYDROCHLORIDE 25 MG: 25 CAPSULE ORAL at 22:16

## 2025-07-22 RX ADMIN — GLYCOPYRROLATE 0.2 MG: 0.2 INJECTION INTRAMUSCULAR; INTRAVENOUS at 08:10

## 2025-07-22 RX ADMIN — ONDANSETRON 4 MG: 2 INJECTION, SOLUTION INTRAMUSCULAR; INTRAVENOUS at 11:16

## 2025-07-22 RX ADMIN — SODIUM CHLORIDE: 9 INJECTION, SOLUTION INTRAVENOUS at 08:55

## 2025-07-22 RX ADMIN — SENNOSIDES, DOCUSATE SODIUM 2 TABLET: 50; 8.6 TABLET, FILM COATED ORAL at 21:23

## 2025-07-22 RX ADMIN — FENTANYL CITRATE 50 MCG: 50 INJECTION, SOLUTION INTRAMUSCULAR; INTRAVENOUS at 10:21

## 2025-07-22 RX ADMIN — FENTANYL CITRATE 50 MCG: 50 INJECTION, SOLUTION INTRAMUSCULAR; INTRAVENOUS at 11:15

## 2025-07-22 RX ADMIN — OXYCODONE AND ACETAMINOPHEN 2 TABLET: 5; 325 TABLET ORAL at 18:13

## 2025-07-22 RX ADMIN — LIDOCAINE HYDROCHLORIDE 100 MG: 20 INJECTION, SOLUTION EPIDURAL; INFILTRATION; INTRACAUDAL; PERINEURAL at 08:54

## 2025-07-22 RX ADMIN — PROPOFOL 50 MG: 10 INJECTION, EMULSION INTRAVENOUS at 11:15

## 2025-07-22 RX ADMIN — ROCURONIUM BROMIDE 50 MG: 10 INJECTION, SOLUTION INTRAVENOUS at 08:54

## 2025-07-22 RX ADMIN — DEXAMETHASONE SODIUM PHOSPHATE 4 MG: 4 INJECTION, SOLUTION INTRA-ARTICULAR; INTRALESIONAL; INTRAMUSCULAR; INTRAVENOUS; SOFT TISSUE at 09:11

## 2025-07-22 RX ADMIN — SODIUM CHLORIDE 2000 MG: 9 INJECTION, SOLUTION INTRAVENOUS at 08:52

## 2025-07-22 RX ADMIN — OXYCODONE AND ACETAMINOPHEN 2 TABLET: 5; 325 TABLET ORAL at 14:01

## 2025-07-22 RX ADMIN — SODIUM CHLORIDE, POTASSIUM CHLORIDE, SODIUM LACTATE AND CALCIUM CHLORIDE 9 ML/HR: 600; 310; 30; 20 INJECTION, SOLUTION INTRAVENOUS at 08:15

## 2025-07-22 NOTE — ANESTHESIA PREPROCEDURE EVALUATION
Anesthesia Evaluation     Patient summary reviewed and Nursing notes reviewed   no history of anesthetic complications:   NPO Solid Status: > 8 hours  NPO Liquid Status: > 2 hours           Airway   Mallampati: III  TM distance: >3 FB  Neck ROM: full  No difficulty expected  Dental - normal exam     Pulmonary - normal exam    breath sounds clear to auscultation  (+) a smoker (quit approx 26 yrs ago) Former, cigarettes,  Cardiovascular - normal exam  Exercise tolerance: good (4-7 METS)    ECG reviewed  Rhythm: regular  Rate: normal    (+) hyperlipidemia    ROS comment: H/o passing out with IV stick.  Hr 100 upon arrival this morning but dropped to 30s with stick with patient symptomatic.  Heart rate now in 70s, patient states feeling better.  Will give 0.2 mg glycopyrrolate.    ECG 12 Lead     Date/Time: 4/7/2025 8:47 AM  Performed by: Christine Maldonado APRN     Authorized by: Christine Maldonado APRN  Comparison: compared with previous ECG from 4/18/2024  Comparison to previous ECG: Change in rate compared to previous EKG  Rhythm: sinus rhythm  Rate: normal  Conduction: conduction normal  ST Segments: ST segments normal  T Waves: T waves normal  QRS axis: normal     Clinical impression: normal ECG      Neuro/Psych  (+) headaches  GI/Hepatic/Renal/Endo    (+) GERD    Musculoskeletal (-) negative ROS    Abdominal  - normal exam   Substance History - negative use     OB/GYN negative ob/gyn ROS         Other      history of cancer (Prostate)    ROS/Med Hx Other: HX PALPITATIONS, PROSTATE CA, GERD. METS>4. ECHO 12/22 EF 65%. CLEARED WITH ACCEPTABLE RISK CARDIAC ZITA MALDONADO APRN. ECHO 121/22 EF 65%. ELM     07/08/25 13:27  ABO Type: O  RH type: Positive    07/08/25 13:32  Sodium: 141  Potassium: 4.1  Chloride: 106  CO2: 24.5  Anion Gap: 10.5  BUN: 13.3  Creatinine: 1.19  BUN/Creatinine Ratio: 11.2  eGFR: 69.5  Glucose: 84  Calcium: 8.8  Alkaline Phosphatase: 97    Total Protein: 6.7  Albumin: 4.3  Globulin: 2.4  A/G Ratio:  1.8  AST (SGOT): 18  ALT (SGPT): 24  Total Bilirubin: 0.3    WBC: 6.03  RBC: 4.80  Hemoglobin: 14.3  Hematocrit: 42.6  Platelets: 226  RDW: 13.0  MCV: 88.8  MCH: 29.8  MCHC: 33.6  MPV: 10.3  RDW-SD: 42.1                    Anesthesia Plan    ASA 2     general     (Patient understands anesthesia not responsible for dental damage.)  intravenous induction     Anesthetic plan, risks, benefits, and alternatives have been provided, discussed and informed consent has been obtained with: patient.    Use of blood products discussed with patient .    Plan discussed with CRNA.        CODE STATUS:

## 2025-07-22 NOTE — PLAN OF CARE
Goal Outcome Evaluation:              Outcome Evaluation: Pateint is an admit from OR this shift. VSS, A&O x4, mckeon catheter in place draining yellow urine with some small clots. 6 lap sites CDI, Medicated for pain, see MAR.

## 2025-07-22 NOTE — NURSING NOTE
Skin assessment completed with Nat DILLARD RN. 6 lap sites to abd, CDI, external hemorrhoids noted, otherwise skin WNL

## 2025-07-22 NOTE — OP NOTE
PROSTATECTOMY LAPAROSCOPIC WITH DAVINCI ROBOT, NODE DISSECTION LAPAROSCOPIC WITH DAVINCI ROBOT  Procedure Report    Patient Name:  Rivera Ku  YOB: 1965    Date of Surgery:  7/22/2025     Pre-op Diagnosis:   Prostate cancer [C61]       Post-Op Diagnosis Codes:     * Prostate cancer [C61]      Procedure/CPT® Codes:    PROSTATECTOMY LAPAROSCOPIC WITH DAVINCI ROBOT  BILATERAL PELVIC LYMPH NODE DISSECTION LAPAROSCOPIC WITH DAVINCI ROBOT  URETHRAL SUSPENSION        Staff:  Surgeon(s):  Ashley Sosa MD    Assistant: Adebayo Saenz RN CSA    Anesthesia: General    Estimated Blood Loss: 200 mL    Implants:    Implant Name Type Inv. Item Serial No.  Lot No. LRB No. Used Action   DEV CLS WND VLOC/180 JORDAN ABS 1/2CIR SZ2/0 23CM 37MM GRN - [id number] Implant DEV CLS WND VLOC/180 JORDAN ABS 1/2CIR SZ2/0 23CM 37MM GRN  COVIDIEN [id number] N/A 1 Implanted   DEV CLS WND VLOC/180 JORDAN ABS 1/2CIR SZ3/0 17MM 23CM GRN - [id number] Implant DEV CLS WND VLOC/180 JORDAN ABS 1/2CIR SZ3/0 17MM 23CM GRN  COVIDIEN [id number] N/A 1 Implanted   DEV CLS WND VLOC/90 JORDAN ABS 1/2CIR SZ3/0 17MM 23CM UD - [id number] Implant DEV CLS WND VLOC/90 JORDAN ABS 1/2CIR SZ3/0 17MM 23CM UD  COVIDIEN [id number] N/A 1 Implanted       Specimen:          Specimens       ID Source Type Tests Collected By Collected At Frozen?    A Prostate Tissue TISSUE PATHOLOGY EXAM   Ashley Sosa MD 7/22/25 1113     Description: Prostate    B Lymph Node Tissue TISSUE PATHOLOGY EXAM   Ashley Sosa MD 7/22/25 1114     Description: Bilateral Pelvic Lymph Nodes                Complications: None    Description of Procedure:     After proper consent was obtained, the patient was brought to the operating room and  was placed in the supine position with his legs abducted.  A 20 [place] Dobbins catheter was placed and a 30 ml balloon was inflated with 15 ml of saline.  Pneumoperitoneum was achieved using a Veress needle through a 1 cm  supraumbilical incision, and an 8 [place] trocar was placed through this incision to serve as a camera port.  A zero-degree lens was then inserted through the camera port and the abdomen was inspected.  Two 8 [place] robotic working ports were then placed approximately 10 cm lateral and slightly inferior to the camera port on an imaginary line between the supraumbilical port and the left and right anterior-superior iliac spines, respectively.  Additional retraction ports were placed in the right lower quadrant (5 [place]) and left upper quadrant (8 [place]) of the abdomen.  Following this, the da Demetria robotic system was then docked to the working ports.    Following this, a robotic prostatectomy was carried out.  This was initiated by reflecting the bladder cephalad and entering the retropubic space of Retzius.  An inverted U-shaped incision in the pelvic peritoneum was fashioned beginning at the junction of the left vas deferens and the left medial umbilical ligament and extending anteriorly just lateral to the ligament upwards toward the umbilicus.  The incision was extended across the middling and then downwards just lateral to the left medial umbilical ligament and ending at the junction of the ligament and the right vas deferens.  The peritoneum, urachal remnants and surrounding fat were reflected cephalad thus exposing the anterior surface of the prostate, bladder and pubic bone.    Attention was then directed to removal of the prostate.  This was initiated by removing the periprostatic fat from the anterior surface of the prostate between the level of the bladder neck and the apex of the prostate.  Any bleeding points were controlled with electrocautery and care was taken to avoid injury to the prostatic capsule.  Following this, the lateral pelvic fascia was incised as it was identified as it coursed over the anterior surface of the prostate.  The fascia was then incised with electrocautery just lateral to the  edge of the prostate on both the left and right sides thus entering the periprostatic space.  The fascia was incised from the level of the prostatic-vesicular junction and extended distally to the puboprostatic ligaments.  The prostate was freed from the muscles of the lateral pelvic sidewall, and any perforating blood vessels were controlled using bipolar electrocautery.     At this point a urethral suspension (urethropexy) was performed.  This was done to hasten the return of urinary incontinence postoperatively and to restore the normal anatomic position of the urethra after prostatectomy.  A V-Loc suture was used to ligate the dorsal vein by passing behind the vein and anterior to the urethra.  This was cinched down.  The stitch was then passed through the pubic symphysis periostium.  This was down twice and then cinched down tight, elevating the urethra towards the pubic bone.      I then performed a bilateral pelvic lymph node dissection was performed due to the patient's grade and stage of cancer.  Pelvic lymph nodes were taken along the external iliac vein to the level of the circumflex vein, inferiorly to the pelvic bone and posteriorly to the obturator nerve.  Care was taken not to injure the vessels or the nerve during this dissection.  This was done first on the patient's right side and then on the left.  The lymph nodes were sent to pathology with the prostate.    Attention was then directed toward the anterior bladder neck.  The junction between the prostate and the bladder was identified by placing tension on the catheter, and then using blunt dissection with manual electrocautery a groove was fashioned between the prostate and bladder.  The groove was expanded posteriorly until the urethra was identified.  The groove was then extended further posteriorly on either side of the urethra using blunt dissection. This was extended medially posterior to the urethra.  Any bleeding points were controlled with  electrocautery.  The anterior urethra was then divided sharply in the midline and the posterior bladder neck was identified.  The urethra was subsequently divided.  The Dobbins catheter was removed from the bladder and was subsequently used to retract the prostate anteriorly.    The dissection was carried out posteriorly  the prostate from the bladder to the level of Denonvilliers fascia.  At this time, the ampullae of the left and right vas deferens were identified, were dissected off the medial surfaces of their respective seminal vesicles and were subsequently divided taking care to minimize the use of electrocautery and to avoid rectal injury.  The left and then the right seminal vesicles were dissected off the posterior surface of the bladder and the anterior surface of the rectum. When this was completed, the anterior leaflet of Denonvilliers fascia was incised using electrocautery just caudal to the junction of the ampulla of the vas and the prostate.  The retroprostatic space was then developed using only blunt dissection  the prostate from the anterior surface of the rectum by  the two leaflets of Denonvilliers fascia from the cephalad portion of the prostate to the apex of the prostate.  The fibers of the rectal urethralis muscles were transected using sharp dissection.    Attention was then directed to the neurovascular bundles.  A nerve sparing technique was used to remove the prostate.  The dissection was initiated on the right.  The vascular pedicles were identified entering the prostate at the 5 o'clock position at the posterolateral junction of the prostate and the rectum. These were controlled with bipolar and were subsequently divided.  The neurovascular bundle within the groove just lateral to the rectum was identified and was dissected away from the surgical specimen.  This was then completed on the right.      Attention was then directed to the apex of the prostate.   Beginning anteriorly, the dorsal vein was transected approximately 1-2 cm cephalad to the previously placed tie.  Care was taken to avoid incision of the anterior prostatic capsule.  Any bleeding from the dorsal vein was controlled with bipolar electrocautery.      The dorsal vein was dissected off the anterior surface of the prostate.  The urethra was identified posterior to the dorsal vein as it exited the apex of the prostate.  The lateral pelvic fascia just lateral to the urethra at the apex of the prostate was transected sharply with care taken to identify and preserve the neurovascular bundle, the suspensor muscles of the apex, and the pelvic floor muscles.  All these sutures were freed with extra care to avoid incision of the apical prostatic tissue.  The anterior urethra with the indwelling Dobbins catheter was subsequently divided.  The Dobbins catheter was identified and was removed and the posterior urethra was divided again with care taken to avoid injury to the apex of the prostate.      Following this, the prostate, seminal vesicles and ampulla of the vas and BPLN were placed in a retrieval bag and this bag was then placed in the right upper quadrant for removal at a later time.  The pelvis was then thoroughly irrigated and inspected.  Any brisk bleeding was controlled.  No rectal injury was noted.    Anastomosis of urethra to bladder was then performed using two sutures; one from left side and one from right, proceeding from posterior to anterior on either side.  At the completion, the anastomosis was noted to be water tight, and a new Dobbins catheter was placed into the bladder.    The da Demetria robotic system was then undocked.  The surgical specimen was removed through the camera port site.  The fascia was closed in an interrupted fashion using 0 Vicryl suture.  The skin incisions were closed using running subcuticular monocryl.  Sterile dressings were applied after injected Marcaine.      The patient  was transferred to the PACU in stable condition with no complications.  Blood loss was 200 cc.  All counts were correct.     Assistant: Adebayo Saenz RN CSA  was responsible for performing the following activities: Retraction, Suction, Irrigation, Suturing, Closing, Placing Dressing, and Held/Positioned Camera and their skilled assistance was necessary for the success of this case.    Ashley Sosa MD     Date: 7/22/2025  Time: 12:15 EDT

## 2025-07-22 NOTE — ANESTHESIA POSTPROCEDURE EVALUATION
Patient: Rivera Ku    Procedure Summary       Date: 07/22/25 Room / Location: [facility] OR  / [facility] MAIN OR    Anesthesia Start: 0849 Anesthesia Stop: 1200    Procedures:       PROSTATECTOMY LAPAROSCOPIC WITH DAVINCI ROBOT (Abdomen)      NODE DISSECTION LAPAROSCOPIC WITH DAVINCI ROBOT (Bilateral: Abdomen) Diagnosis:       Prostate cancer      (Prostate cancer [C61])    Surgeons: Ashley Sosa MD Provider: Melita Salinas MD    Anesthesia Type: general ASA Status: 2            Anesthesia Type: general    Vitals  Vitals Value Taken Time   /65 07/22/25 13:22   Temp 37.2 °C (98.9 °F) 07/22/25 13:10   Pulse 101 07/22/25 13:22   Resp 16 07/22/25 13:20   SpO2 95 % 07/22/25 13:22   Vitals shown include unfiled device data.        Post Anesthesia Care and Evaluation    Patient location during evaluation: bedside  Patient participation: complete - patient participated  Level of consciousness: awake  Pain management: adequate    Airway patency: patent  PONV Status: none  Cardiovascular status: acceptable and stable  Respiratory status: acceptable  Hydration status: acceptable

## 2025-07-23 ENCOUNTER — READMISSION MANAGEMENT (OUTPATIENT)
Dept: CALL CENTER | Facility: HOSPITAL | Age: 60
End: 2025-07-23
Payer: COMMERCIAL

## 2025-07-23 VITALS
BODY MASS INDEX: 24.22 KG/M2 | WEIGHT: 131.61 LBS | HEART RATE: 102 BPM | DIASTOLIC BLOOD PRESSURE: 76 MMHG | HEIGHT: 62 IN | SYSTOLIC BLOOD PRESSURE: 119 MMHG | RESPIRATION RATE: 16 BRPM | TEMPERATURE: 97.7 F | OXYGEN SATURATION: 98 %

## 2025-07-23 LAB
ANION GAP SERPL CALCULATED.3IONS-SCNC: 10.1 MMOL/L (ref 5–15)
BUN SERPL-MCNC: 17.6 MG/DL (ref 8–23)
BUN/CREAT SERPL: 15.9 (ref 7–25)
CALCIUM SPEC-SCNC: 8.4 MG/DL (ref 8.6–10.5)
CHLORIDE SERPL-SCNC: 104 MMOL/L (ref 98–107)
CO2 SERPL-SCNC: 23.9 MMOL/L (ref 22–29)
CREAT SERPL-MCNC: 1.11 MG/DL (ref 0.76–1.27)
DEPRECATED RDW RBC AUTO: 43.2 FL (ref 37–54)
EGFRCR SERPLBLD CKD-EPI 2021: 75.5 ML/MIN/1.73
ERYTHROCYTE [DISTWIDTH] IN BLOOD BY AUTOMATED COUNT: 13.1 % (ref 12.3–15.4)
GLUCOSE SERPL-MCNC: 93 MG/DL (ref 65–99)
HCT VFR BLD AUTO: 38 % (ref 37.5–51)
HGB BLD-MCNC: 12.8 G/DL (ref 13–17.7)
MCH RBC QN AUTO: 30.3 PG (ref 26.6–33)
MCHC RBC AUTO-ENTMCNC: 33.7 G/DL (ref 31.5–35.7)
MCV RBC AUTO: 90 FL (ref 79–97)
PLATELET # BLD AUTO: 229 10*3/MM3 (ref 140–450)
PMV BLD AUTO: 10.7 FL (ref 6–12)
POTASSIUM SERPL-SCNC: 4.4 MMOL/L (ref 3.5–5.2)
RBC # BLD AUTO: 4.22 10*6/MM3 (ref 4.14–5.8)
SODIUM SERPL-SCNC: 138 MMOL/L (ref 136–145)
WBC NRBC COR # BLD AUTO: 13.59 10*3/MM3 (ref 3.4–10.8)

## 2025-07-23 PROCEDURE — 85027 COMPLETE CBC AUTOMATED: CPT | Performed by: UROLOGY

## 2025-07-23 PROCEDURE — G0378 HOSPITAL OBSERVATION PER HR: HCPCS

## 2025-07-23 PROCEDURE — 80048 BASIC METABOLIC PNL TOTAL CA: CPT | Performed by: UROLOGY

## 2025-07-23 PROCEDURE — 25010000002 HEPARIN (PORCINE) PER 1000 UNITS: Performed by: UROLOGY

## 2025-07-23 PROCEDURE — 94799 UNLISTED PULMONARY SVC/PX: CPT

## 2025-07-23 PROCEDURE — 99024 POSTOP FOLLOW-UP VISIT: CPT | Performed by: UROLOGY

## 2025-07-23 RX ORDER — AMOXICILLIN 250 MG
1 CAPSULE ORAL 2 TIMES DAILY
Qty: 60 TABLET | Refills: 0 | Status: SHIPPED | OUTPATIENT
Start: 2025-07-23 | End: 2025-08-22

## 2025-07-23 RX ORDER — OXYCODONE AND ACETAMINOPHEN 5; 325 MG/1; MG/1
1 TABLET ORAL EVERY 4 HOURS PRN
Qty: 20 TABLET | Refills: 0 | Status: SHIPPED | OUTPATIENT
Start: 2025-07-23 | End: 2025-07-29

## 2025-07-23 RX ADMIN — OXYCODONE AND ACETAMINOPHEN 2 TABLET: 5; 325 TABLET ORAL at 10:07

## 2025-07-23 RX ADMIN — SENNOSIDES, DOCUSATE SODIUM 2 TABLET: 50; 8.6 TABLET, FILM COATED ORAL at 10:07

## 2025-07-23 RX ADMIN — HEPARIN SODIUM 5000 UNITS: 5000 INJECTION, SOLUTION INTRAVENOUS; SUBCUTANEOUS at 10:07

## 2025-07-23 RX ADMIN — PANTOPRAZOLE SODIUM 40 MG: 40 TABLET, DELAYED RELEASE ORAL at 10:07

## 2025-07-23 NOTE — OUTREACH NOTE
Prep Survey      Flowsheet Row Responses   [facility] facility patient discharged from? [facility]   Is LACE score < 7 ? Yes   Eligibility [facility]   Date of Admission 07/22/25   Date of Discharge 07/23/25   Discharge Disposition Home or Self Care   Discharge diagnosis Proatatectomy lap this visit   Does the patient have one of the following disease processes/diagnoses(primary or secondary)? General Surgery   Does the patient have Home health ordered? No   Is there a DME ordered? No   Prep survey completed? Yes            ERIK VILLATORO - Registered Nurse

## 2025-07-23 NOTE — PLAN OF CARE
Goal Outcome Evaluation:  Plan of Care Reviewed With: patient        Progress: improving  Outcome Evaluation: VSS A/O x4 Pts linda is having good output. It's ana in color with some sediment in the bottom. Pt walked the unit a couple times overnight with no issues. Pt has not had a BM & still has not been able to pass gas yet either. Pt rested well overnight with no issues. All needs were met.

## 2025-07-23 NOTE — DISCHARGE SUMMARY
Date of Discharge:  7/23/2025    Discharge Diagnosis: Prostate cancer    Presenting Problem/History of Present Illness  Active Hospital Problems    Diagnosis  POA    **Prostate cancer [C61]  Yes      Resolved Hospital Problems   No resolved problems to display.        Hospital Course  Patient is a 59 y.o. male presented with prostate cancer and underwent a robotic radical prostatectomy with bilateral pelvic lymph node dissection.  He tolerated procedure well and on POD 1 is ready for discharge.       Procedures Performed    Procedure(s):  PROSTATECTOMY LAPAROSCOPIC WITH DAVINCI ROBOT  NODE DISSECTION LAPAROSCOPIC WITH DAVINCI ROBOT  -------------------       Consults:   Consults       No orders found for last 30 day(s).              Condition on Discharge:  Good    Vital Signs  Temp:  [97.1 °F (36.2 °C)-98.9 °F (37.2 °C)] 97.7 °F (36.5 °C)  Heart Rate:  [] 100  Resp:  [13-20] 16  BP: ()/(55-84) 107/70    Physical Exam:   Adbomen soft, nondistended, appropriately tender to palpation, incisions clean, dry and intact, urine clear    Discharge Disposition  Home or Self Care    Discharge Medications     Discharge Medications        New Medications        Instructions Start Date   oxyCODONE-acetaminophen 5-325 MG per tablet  Commonly known as: PERCOCET   1 tablet, Oral, Every 4 Hours PRN      sennosides-docusate 8.6-50 MG per tablet  Commonly known as: PERICOLACE   1 tablet, Oral, 2 Times Daily             Continue These Medications        Instructions Start Date   aspirin 81 MG EC tablet   81 mg, Oral, Daily      ezetimibe 10 MG tablet  Commonly known as: ZETIA   10 mg, Oral, Daily      nortriptyline 25 MG capsule  Commonly known as: PAMELOR   25 mg, Nightly      pantoprazole 40 MG EC tablet  Commonly known as: PROTONIX   40 mg, Oral, Daily      tamsulosin 0.4 MG capsule 24 hr capsule  Commonly known as: FLOMAX   1 capsule, Daily               Discharge Diet: Regular diet    Activity at Discharge: Walking and  stairs are both allowed.  Patient should not do anything more strenuous than going up stairs.  Do not lift more than 10 pounds, or a gallon of milk.  Patient may shower starting day of discharge.  Do not rub incisions.  Patient may remove dressings from incisions in 1 to 2 days.  Regular diet with no dietary restrictions.  Drink plenty of fluids.  If you have a Dobbins catheter in place, follow instructions given by nurse at the time of discharge.  Call the office if there are any concerns or questions.    Restart blood thinners day after discharge unless instructed otherwise.     Follow-up Appointments  Future Appointments   Date Time Provider Department Center   7/30/2025 10:00 AM [facility] 1 BH [facility]   7/30/2025 11:00 AM Val Griffiths APRN [facility]TRING [facility]   1/8/2026  8:15 AM Alesha Kim APRN [facility]OMY ETWN [facility]         Test Results Pending at Discharge  Pending Labs       Order Current Status    Tissue Pathology Exam Collected (07/22/25 1113)    Basic Metabolic Panel In process             Ashley Sosa MD  07/23/25  06:00 EDT

## 2025-07-23 NOTE — PLAN OF CARE
Goal Outcome Evaluation:         Pt discharging home with spouse, mckeon catheter will remain in place, pt to follow up next week. [facility] education (teach back and printed material) given and explained to patient along with extra supplies. Pt ambulating in hallway standby.

## 2025-07-24 ENCOUNTER — TRANSITIONAL CARE MANAGEMENT TELEPHONE ENCOUNTER (OUTPATIENT)
Dept: CALL CENTER | Facility: HOSPITAL | Age: 60
End: 2025-07-24
Payer: COMMERCIAL

## 2025-07-24 NOTE — OUTREACH NOTE
Call Center TCM Note      Flowsheet Row Responses   [facility] facility patient discharged from? [facility]   Does the patient have one of the following disease processes/diagnoses(primary or secondary)? General Surgery   TCM attempt successful? No   Unsuccessful attempts Attempt 1            ARMINDA LAMB - Registered Nurse    7/24/2025, 10:15 EDT

## 2025-07-24 NOTE — OUTREACH NOTE
Call Center TCM Note      Flowsheet Row Responses   [facility] patient discharged from? [facility]   Does the patient have one of the following disease processes/diagnoses(primary or secondary)? General Surgery   TCM attempt successful? Yes   Call start time 1334   Call end time 1343   Discharge diagnosis Proatatectomy lap this visit   Meds reviewed with patient/caregiver? Yes   Is the patient having any side effects they believe may be caused by any medication additions or changes? No   Does the patient have all medications related to this admission filled (includes all antibiotics, pain medications, etc.) Yes   Is the patient taking all medications as directed (includes completed medication regime)? Yes   Comments HOSP DC FU appt 7/29/25 1115 am. TCM call complete.   Does the patient have an appointment with their PCP within 7-14 days of discharge? Yes   Has home health visited the patient within 72 hours of discharge? N/A   Psychosocial issues? No   Did the patient receive a copy of their discharge instructions? Yes   Nursing interventions Reviewed instructions with patient   What is the patient's perception of their health status since discharge? Improving   Nursing interventions Nurse provided patient education   Is the patient /caregiver able to teach back basic post-op care? Lifting as instructed by MD in discharge instructions, Take showers only when approved by MD-sponge bathe until then, No tub bath, swimming, or hot tub until instructed by MD   Is the patient/caregiver able to teach back signs and symptoms of incisional infection? Fever, Pus or odor from incision, Incisional warmth, Increased drainage or bleeding, Increased redness, swelling or pain at the incisonal site   Is the patient/caregiver able to teach back steps to recovery at home? Set small, achievable goals for return to baseline health, Eat a well-balance diet   Is the patient/caregiver able to teach back the hierarchy of who to call/visit  for symptoms/problems? PCP, Specialist, Home health nurse, Urgent Care, ED, 911 Yes   TCM call completed? Yes   Wrap up additional comments Pt reports he is doing ok but has not had BM since hosp stay. Pt states he is taking the caroline as ordered and drinking fluids. He did have some cramps like he could have a BM but no results yet. Pt denies abd pain , n/v. Pt aware that if no results by tomorrow to contact his Dr.   Call end time 1343            ARMINDA LAMB - Registered Nurse    7/24/2025, 13:44 EDT

## 2025-07-25 LAB
CYTO UR: NORMAL
LAB AP CASE REPORT: NORMAL
LAB AP CLINICAL INFORMATION: NORMAL
LAB AP SYNOPTIC CHECKLIST: NORMAL
PATH REPORT.FINAL DX SPEC: NORMAL
PATH REPORT.GROSS SPEC: NORMAL

## 2025-07-29 ENCOUNTER — OFFICE VISIT (OUTPATIENT)
Dept: FAMILY MEDICINE CLINIC | Facility: CLINIC | Age: 60
End: 2025-07-29
Payer: COMMERCIAL

## 2025-07-29 VITALS
HEIGHT: 62 IN | DIASTOLIC BLOOD PRESSURE: 64 MMHG | HEART RATE: 89 BPM | BODY MASS INDEX: 23.74 KG/M2 | SYSTOLIC BLOOD PRESSURE: 107 MMHG | WEIGHT: 129 LBS | OXYGEN SATURATION: 97 %

## 2025-07-29 DIAGNOSIS — Z09 HOSPITAL DISCHARGE FOLLOW-UP: Primary | ICD-10-CM

## 2025-07-29 DIAGNOSIS — E83.51 HYPOCALCEMIA: ICD-10-CM

## 2025-07-29 DIAGNOSIS — E78.5 HYPERLIPIDEMIA, UNSPECIFIED HYPERLIPIDEMIA TYPE: ICD-10-CM

## 2025-07-29 DIAGNOSIS — D64.9 NORMOCYTIC ANEMIA: ICD-10-CM

## 2025-07-29 PROCEDURE — 99214 OFFICE O/P EST MOD 30 MIN: CPT | Performed by: STUDENT IN AN ORGANIZED HEALTH CARE EDUCATION/TRAINING PROGRAM

## 2025-07-29 NOTE — PROGRESS NOTES
Subjective:       Rivera Ku is a 60 y.o. male with a concurrent medical history of hyperlipidemia, ocular migraines, gastroesophageal reflux disease and past medical history of prostate cancer status post radical prostatectomy who presents for posthospital discharge and for early physical.    Rivera was just discharged in the hospital from a robotic assisted radical prostatectomy with Dr. Sosa. He is doing very well in recovery and has follow-up with her soon.  I do note some very slight leukocytosis, decreased hemoglobin and hypocalcemia, likely dilutional, prior to discharge.  Will repeat labs in 1 month.    Rivera has history of hyperlipidemia, currently treated with Zetia as he had severe weakness with statin.  Will repeat lipid panel in 1 month.    Given his history of ocular migraines.  These are currently well-controlled on nortriptyline.  He is no longer having the weakness he had reported at previous encounter.  Will continue with current medication.    Rivera takes PPI for gastroesophageal reflux disease.  Will continue today.      Past Medical Hx:  Past Medical History:   Diagnosis Date    Cancer     PROSTATE    Elevated cholesterol     Elevated PSA     GERD (gastroesophageal reflux disease)     Hyperlipidemia     Ocular migraine 01/31/2023    Palpitations     FOLLOWS CHRISTOPHER BENAVIDES NP CARDIOLOGY FOR SURG CLEARANCE NO CP OR SOA       Past Surgical Hx:  Past Surgical History:   Procedure Laterality Date    COLONOSCOPY      COLONOSCOPY N/A 01/18/2024    Procedure: COLONOSCOPY;  Surgeon: Paul Herrera MD;  Location: [facility] ENDOSCOPY;  Service: General;  Laterality: N/A;  NORMAL    NODE DISSECTION LAPAROSCOPIC Bilateral 7/22/2025    Procedure: NODE DISSECTION LAPAROSCOPIC WITH DAVINCI ROBOT;  Surgeon: Ashley Sosa MD;  Location: [facility] MAIN OR;  Service: Robotics - DaVinci;  Laterality: Bilateral;    PROSTATE BIOPSY N/A 05/16/2024    Procedure: MAGNETIC RESONANCE IMAGING FUSION TRANSRECTAL  ULTRASOUND AND BIOPSY OF THE PROSTATE;  Surgeon: Ashley Sosa MD;  Location: [facility] MAIN OR;  Service: Urology;  Laterality: N/A;    PROSTATE BIOPSY N/A 5/22/2025    Procedure: PROSTATE ULTRASOUND BIOPSY MRI FUSION WITH URONAV  Ultrasound of the prostate through the rectum with biopsies of the prostate using MRI images;  Surgeon: Ashley Sosa MD;  Location: [facility] MAIN OR;  Service: Urology;  Laterality: N/A;    PROSTATECTOMY N/A 7/22/2025    Procedure: PROSTATECTOMY LAPAROSCOPIC WITH DAVINCI ROBOT;  Surgeon: Ashley Sosa MD;  Location: [facility] MAIN OR;  Service: Robotics - DaVinci;  Laterality: N/A;       Current Meds:    Current Outpatient Medications:     aspirin 81 MG EC tablet, Take 1 tablet by mouth Daily., Disp: 30 tablet, Rfl: 5    ezetimibe (ZETIA) 10 MG tablet, TAKE 1 TABLET BY MOUTH DAILY, Disp: 90 tablet, Rfl: 2    nortriptyline (PAMELOR) 25 MG capsule, Take 1 capsule by mouth Every Night., Disp: , Rfl:     oxyCODONE-acetaminophen (PERCOCET) 5-325 MG per tablet, Take 1 tablet by mouth Every 4 (Four) Hours As Needed for Moderate Pain for up to 6 days., Disp: 20 tablet, Rfl: 0    pantoprazole (PROTONIX) 40 MG EC tablet, TAKE 1 TABLET BY MOUTH DAILY, Disp: 90 tablet, Rfl: 0    sennosides-docusate (PERICOLACE) 8.6-50 MG per tablet, Take 1 tablet by mouth 2 (Two) Times a Day for 30 days., Disp: 60 tablet, Rfl: 0    tamsulosin (FLOMAX) 0.4 MG capsule 24 hr capsule, Take 1 capsule by mouth Daily., Disp: , Rfl:     Allergies:  Allergies   Allergen Reactions    Crestor [Rosuvastatin] Mental Status Change and Myalgia     Leg weakness and brain fog       Family Hx:  Family History   Problem Relation Age of Onset    Hearing loss Father     Cancer Father     Lung cancer Father     Heart disease Father     Malig Hyperthermia Neg Hx         Social History:  Social History     Socioeconomic History    Marital status:    Tobacco Use    Smoking status: Former     Current packs/day: 0.00     Average  "packs/day: 0.5 packs/day for 9.2 years (4.6 ttl pk-yrs)     Types: Cigarettes     Start date: 1989     Quit date: 10/11/1998     Years since quittin.8     Passive exposure: Past    Smokeless tobacco: Former     Types: Chew    Tobacco comments:     30 yrs ago   Vaping Use    Vaping status: Never Used   Substance and Sexual Activity    Alcohol use: Never    Drug use: Never    Sexual activity: Defer       Review of Systems  Review of Systems   Gastrointestinal:  Negative for constipation (resolved).       Objective:     /64   Pulse 89   Ht 157.5 cm (62\")   Wt 58.5 kg (129 lb)   SpO2 97%   BMI 23.59 kg/m²   Physical Exam  Constitutional:       General: He is not in acute distress.     Appearance: Normal appearance. He is normal weight. He is not ill-appearing, toxic-appearing or diaphoretic.   Cardiovascular:      Rate and Rhythm: Normal rate.   Pulmonary:      Effort: Pulmonary effort is normal.      Breath sounds: Normal breath sounds.   Neurological:      Mental Status: He is alert.   Psychiatric:         Mood and Affect: Mood normal.         Behavior: Behavior normal.          Assessment/Plan:     Diagnoses and all orders for this visit:    1. Hospital discharge follow-up (Primary)    Rivera was just discharged in the hospital from a robotic assisted radical prostatectomy with Dr. Sosa. He is doing very well in recovery and has follow-up with her soon.  I do note some very slight leukocytosis, decreased hemoglobin and hypocalcemia, likely dilutional, prior to discharge.  Will repeat labs in 1 month.      2. Hyperlipidemia, unspecified hyperlipidemia type  -     Lipid panel; Future    3. Hypocalcemia  -     Renal Function Panel; Future    4. Normocytic anemia  -     CBC No Differential; Future        Follow-up:     Return in about 1 year (around 2026) for Annual physical.    Preventative:  Health Maintenance   Topic Date Due    LIPID PANEL  2025    ZOSTER VACCINE (2 of 2) 2025 " (Originally 10/21/2024)    COVID-19 Vaccine (3 - 2024-25 season) 07/29/2026 (Originally 9/1/2024)    Pneumococcal Vaccine 50+ (1 of 1 - PCV) 07/29/2026 (Originally 7/25/2013)    ANNUAL PHYSICAL  08/26/2025    INFLUENZA VACCINE  10/01/2025    COLORECTAL CANCER SCREENING  01/18/2034    TDAP/TD VACCINES (2 - Td or Tdap) 01/30/2034    HEPATITIS C SCREENING  Completed           This document has been electronically signed by Wesley Celaya MD on July 29, 2025 11:42 EDT       Parts of this note are electronic transcriptions/translations of spoken language to printed text using the Dragon Dictation system.

## 2025-07-30 ENCOUNTER — OFFICE VISIT (OUTPATIENT)
Dept: UROLOGY | Age: 60
End: 2025-07-30
Payer: COMMERCIAL

## 2025-07-30 ENCOUNTER — HOSPITAL ENCOUNTER (OUTPATIENT)
Dept: GENERAL RADIOLOGY | Facility: HOSPITAL | Age: 60
Discharge: HOME | End: 2025-07-30
Payer: COMMERCIAL

## 2025-07-30 VITALS — WEIGHT: 128.97 LBS | BODY MASS INDEX: 23.73 KG/M2 | HEIGHT: 62 IN | RESPIRATION RATE: 14 BRPM

## 2025-07-30 DIAGNOSIS — C61 PROSTATE CANCER: Primary | ICD-10-CM

## 2025-07-30 DIAGNOSIS — C61 PROSTATE CANCER: ICD-10-CM

## 2025-07-30 PROCEDURE — 74430 CONTRAST X-RAY BLADDER: CPT

## 2025-07-30 PROCEDURE — 99024 POSTOP FOLLOW-UP VISIT: CPT | Performed by: NURSE PRACTITIONER

## 2025-07-30 PROCEDURE — 25510000002 DIATRIZOATE MEGLUMINE PER 1 ML: Performed by: UROLOGY

## 2025-07-30 RX ADMIN — DIATRIZOATE MEGLUMINE 160 ML: 300 INJECTION, SOLUTION INTRAVENOUS at 11:36

## 2025-07-30 NOTE — PROGRESS NOTES
Chief Complaint: Prostate Cancer    Subjective         History of Present Illness  Rivera Ku is a 60 y.o. male presents to [facility] UROLOGY to be seen for postop.      Patient had radical prostatectomy on 7/22/2025 by Dr. Sosa for prostate cancer.  He is here for postop visit.  History of Present Illness      Per verbal report from Dr. Da Silva his cystogram showed no leak.      Fever- denies    Nausea/vomiting- denies    Pain- denies- no longer requiring pain meds    Objective     Past Medical History:   Diagnosis Date    Cancer     PROSTATE    Elevated cholesterol     Elevated PSA     GERD (gastroesophageal reflux disease)     Hyperlipidemia     Ocular migraine 01/31/2023    Palpitations     FOLLOWS CHRISTOPHER BENAVIDES NP CARDIOLOGY FOR SURG CLEARANCE NO CP OR SOA       Past Surgical History:   Procedure Laterality Date    COLONOSCOPY      COLONOSCOPY N/A 01/18/2024    Procedure: COLONOSCOPY;  Surgeon: Paul Herrera MD;  Location: [facility] ENDOSCOPY;  Service: General;  Laterality: N/A;  NORMAL    NODE DISSECTION LAPAROSCOPIC Bilateral 7/22/2025    Procedure: NODE DISSECTION LAPAROSCOPIC WITH DAVINCI ROBOT;  Surgeon: Ashley Sosa MD;  Location: [facility] OR;  Service: Robotics - DaVinci;  Laterality: Bilateral;    PROSTATE BIOPSY N/A 05/16/2024    Procedure: MAGNETIC RESONANCE IMAGING FUSION TRANSRECTAL ULTRASOUND AND BIOPSY OF THE PROSTATE;  Surgeon: Ashley Sosa MD;  Location: [facility] OR;  Service: Urology;  Laterality: N/A;    PROSTATE BIOPSY N/A 5/22/2025    Procedure: PROSTATE ULTRASOUND BIOPSY MRI FUSION WITH URONAV  Ultrasound of the prostate through the rectum with biopsies of the prostate using MRI images;  Surgeon: Ashley Sosa MD;  Location: [facility] OR;  Service: Urology;  Laterality: N/A;    PROSTATECTOMY N/A 7/22/2025    Procedure: PROSTATECTOMY LAPAROSCOPIC WITH DAVINCI ROBOT;  Surgeon: Ashley Sosa MD;  Location: [facility] OR;  Service:  "Jamaal - Gladys;  Laterality: N/A;         Current Outpatient Medications:     aspirin 81 MG EC tablet, Take 1 tablet by mouth Daily., Disp: 30 tablet, Rfl: 5    ezetimibe (ZETIA) 10 MG tablet, TAKE 1 TABLET BY MOUTH DAILY, Disp: 90 tablet, Rfl: 2    nortriptyline (PAMELOR) 25 MG capsule, Take 1 capsule by mouth Every Night., Disp: , Rfl:     pantoprazole (PROTONIX) 40 MG EC tablet, TAKE 1 TABLET BY MOUTH DAILY, Disp: 90 tablet, Rfl: 0    sennosides-docusate (PERICOLACE) 8.6-50 MG per tablet, Take 1 tablet by mouth 2 (Two) Times a Day for 30 days., Disp: 60 tablet, Rfl: 0    tamsulosin (FLOMAX) 0.4 MG capsule 24 hr capsule, Take 1 capsule by mouth Daily., Disp: , Rfl:   No current facility-administered medications for this visit.    Allergies   Allergen Reactions    Crestor [Rosuvastatin] Mental Status Change and Myalgia     Leg weakness and brain fog        Family History   Problem Relation Age of Onset    Hearing loss Father     Cancer Father     Lung cancer Father     Heart disease Father     Malig Hyperthermia Neg Hx        Social History     Socioeconomic History    Marital status:    Tobacco Use    Smoking status: Former     Current packs/day: 0.00     Average packs/day: 0.5 packs/day for 9.2 years (4.6 ttl pk-yrs)     Types: Cigarettes     Start date: 1989     Quit date: 10/11/1998     Years since quittin.8     Passive exposure: Past    Smokeless tobacco: Former     Types: Chew    Tobacco comments:     30 yrs ago   Vaping Use    Vaping status: Never Used   Substance and Sexual Activity    Alcohol use: Never    Drug use: Never    Sexual activity: Defer       Vital Signs:   Resp 14   Ht 157.5 cm (62\")   Wt 58.5 kg (128 lb 15.5 oz)   BMI 23.59 kg/m²      Physical Exam  Vitals reviewed.   Constitutional:       Appearance: Normal appearance.   Skin:     Comments: Surgical puncture wounds intact well-approximated with no erythema or drainage noted   Neurological:      General: No focal " deficit present.      Mental Status: He is alert and oriented to person, place, and time.   Psychiatric:         Mood and Affect: Mood normal.         Behavior: Behavior normal.          Result Review :   The following data was reviewed by: ROSE MARY Tomlinson on 07/30/2025:  Results for orders placed or performed during the hospital encounter of 07/22/25   Type & Screen    Collection Time: 07/22/25  7:43 AM    Specimen: Arm, Left; Blood   Result Value Ref Range    ABO Type O     RH type Positive     Antibody Screen Negative     T&S Expiration Date 7/29/2025 11:59:00 PM    Tissue Pathology Exam    Collection Time: 07/22/25 11:13 AM    Specimen: A: Prostate; Tissue    B: Lymph Node; Tissue   Result Value Ref Range    Case Report       Surgical Pathology Report                         Case: [id number]                                  Authorizing Provider:  Ashley Sosa MD      Collected:           07/22/2025 11:13 AM          Ordering Location:     [facility] MAIN Received:            07/23/2025 09:04 AM                                 OR                                                                           Pathologist:           Ashley Roman DO                                                       Specimens:   1) - Prostate                                                                                       2) - Lymph Node, Bilateral Pelvic Lymph Nodes                                              Clinical Information       Prostate cancer      Final Diagnosis       1.  Prostate, radical prostatectomy:   - Adenocarcinoma   - Benign lymph node (0/1)   - See synoptic report    2.  Bilateral pelvic lymph nodes   - 5 lymph nodes, negative for metastatic carcinoma (0/5)   - Complete cross-sections of vas deferens    - See synoptic report        Synoptic Checklist       PROSTATE GLAND: Radical Prostatectomy   PROSTATE GLAND: RADICAL PROSTATECTOMY - All Specimens   8th Edition - Protocol  "posted: 9/20/2023      SPECIMEN      Procedure:    Radical prostatectomy       TUMOR      Histologic Type:    Acinar adenocarcinoma, conventional (usual)       Histologic Grade:            Grade:    Grade group 2 ([place] Score 3 + 4 = 7)       Intraductal Carcinoma (IDC):    Present         IDC Incorporated into Grade:    No       Cribriform Glands:    Present       Treatment Effect:    No known presurgical therapy       TUMOR QUANTITATION:            Greatest Dimension of Dominant Nodule (Millimeters):    30 mm    Extraprostatic Extension (EPE):    Present, focal     Urinary Bladder Neck Invasion:    Not identified     Seminal Vesicle Invasion:    Not identified     Lymphatic and / or Vascular Invasion:    Not Identified       MARGINS    Margin Status:    All margins negative for invasive carcinoma       REGIONAL LYMPH NODES    Regional Lymph Node Status:          :    All regional lymph nodes negative for tumor       Number of Lymph Nodes Examined:    6       pTNM CLASSIFICATION (AJCC 8th Edition)      Reporting of pT, pN, and (when applicable) pM categories is based on information available to the pathologist at the time the report is issued. As per the AJCC (Chapter 1, 8th Ed.) it is the managing physician’s responsibility to establish the final pathologic stage based upon all pertinent information, including but potentially not limited to this pathology report.    pT Category:    pT3a     pN Category:    pN0       Gross Description       1. Prostate.  The specimen is received in 1 formalin filled container labeled \"prostate\" and consists of a 36 g, 4.5 x 3.5 x 3.5 cm (M/L x A/B x A/P) radical prostatectomy specimen received with attached, bilateral adnexa.  The right and left seminal vesicles are 3.0 x 1.5 x 0.7 cm.  The left seminal vesicle is 2.5 x 0.5 cm, and the right seminal vesicle is not grossly appreciated.  The prostatic urethra is patent.  The shaggy, cauterized pseudocapsule is differentially inked, " "along with the adnexa, blue on the right, green on the left, and over inked black posteriorly.  Serially sectioning from apex to bladder neck reveals tan, rubbery, whorled prostatic parenchyma with a 3.0 cm ill-defined possible lesion along the apical, mid, and base regions.  Sectioning the seminal vesicles reveals a tan, tubular, convoluted cut surface.  No distinct lymph nodes are identified.  A 0.7 cm fragment of tan soft tissue is identified loose in the specimen container, and sectioning reveals a tan, rubbery cut surface.  Representative sections are submitted as follows:  (Quadrisected slices are sequentially submitted from right anterior, right posterior, left anterior, left posterior) 1A-junction of prostate to adnexa with left vas deferens margin, en face; 1B-bilateral seminal vesicles; 1C-right apical margin, serially sectioned; 1D-left apical margin, serially sectioned; 1E-1F-bisected slice of apex; 1G- 1J-quadrisected slice of mid region; 0V-2C-pqdrfryuniqc slice of base region; 1O-1P-right bladder neck margin, serially sectioned; 1Q-1R-left  bladder neck margin, serially sectioned; 1D-2I-gpurcuxix of right adnexa; 1U-detached fragment, serially sectioned.    2. Lymph Node.  Received in formalin and labeled \"bilateral pelvic lymph nodes\" is a 6.0 x 4.5 x 1.5 cm aggregate of adipose tissue fragments containing 5 disrupted, fatty replaced lymph nodes ranging in size from 1.0-3.5 cm.  A 2.5 x 0.7 x 0.5 cm fragment of tan, rubbery soft tissue is identified loose in the specimen container, and sectioning reveals a rubbery, tubular cut surface, grossly consistent with a vas deferens.  Representative sections are entirely submitted as follows: 2A-1 possible lymph node, serially sectioned; 2B-1 possible lymph node, serially sectioned; 2C-1 lymph node, serially sectioned; 2D-2E-1 lymph node, serially sectioned; 2F-2G-1 lymph node, serially sectioned; 2H-opposing margins from detached vas deferens, en face; " 2I-remainder of vas deferens. MARTHA      Microscopic Description       Microscopic examination performed.     CBC (No Diff)    Collection Time: 07/23/25  5:18 AM    Specimen: Blood   Result Value Ref Range    WBC 13.59 (H) 3.40 - 10.80 10*3/mm3    RBC 4.22 4.14 - 5.80 10*6/mm3    Hemoglobin 12.8 (L) 13.0 - 17.7 g/dL    Hematocrit 38.0 37.5 - 51.0 %    MCV 90.0 79.0 - 97.0 fL    MCH 30.3 26.6 - 33.0 pg    MCHC 33.7 31.5 - 35.7 g/dL    RDW 13.1 12.3 - 15.4 %    RDW-SD 43.2 37.0 - 54.0 fl    MPV 10.7 6.0 - 12.0 fL    Platelets 229 140 - 450 10*3/mm3   Basic Metabolic Panel    Collection Time: 07/23/25  5:18 AM    Specimen: Blood   Result Value Ref Range    Glucose 93 65 - 99 mg/dL    BUN 17.6 8.0 - 23.0 mg/dL    Creatinine 1.11 0.76 - 1.27 mg/dL    Sodium 138 136 - 145 mmol/L    Potassium 4.4 3.5 - 5.2 mmol/L    Chloride 104 98 - 107 mmol/L    CO2 23.9 22.0 - 29.0 mmol/L    Calcium 8.4 (L) 8.6 - 10.5 mg/dL    BUN/Creatinine Ratio 15.9 7.0 - 25.0    Anion Gap 10.1 5.0 - 15.0 mmol/L    eGFR 75.5 >60.0 mL/min/1.73      PSA          11/14/2024    07:39   PSA   PSA 3.890        Results        Procedures        Assessment and Plan    Diagnoses and all orders for this visit:    1. Prostate cancer (Primary)    Radiology reported that there was no leak per cystogram.  Dobbins catheter removed without difficulty.  Bandages removed from incision sites with Steri-Strips left intact.    He is doing well postop.  He will follow-up with Dr. Sosa in 2 to 3 weeks.  Assessment & Plan        Follow Up   No follow-ups on file.  Patient was given instructions and counseling regarding his condition or for health maintenance advice. Please see specific information pulled into the AVS if appropriate.         This document has been electronically signed by ROSE MARY Tomlinson  July 30, 2025 12:43 EDT     Patient or patient representative verbalized consent for the use of Ambient Listening during the visit with  Val Griffiths,  APRN for chart documentation. 7/30/2025  12:43 EDT

## 2025-08-04 ENCOUNTER — HOSPITAL ENCOUNTER (EMERGENCY)
Facility: HOSPITAL | Age: 60
Discharge: HOME | End: 2025-08-05
Attending: EMERGENCY MEDICINE | Admitting: EMERGENCY MEDICINE
Payer: COMMERCIAL

## 2025-08-04 DIAGNOSIS — R33.9 URINARY RETENTION: Primary | ICD-10-CM

## 2025-08-04 DIAGNOSIS — N30.90 CYSTITIS: ICD-10-CM

## 2025-08-04 PROCEDURE — 99284 EMERGENCY DEPT VISIT MOD MDM: CPT

## 2025-08-05 ENCOUNTER — APPOINTMENT (OUTPATIENT)
Dept: CT IMAGING | Facility: HOSPITAL | Age: 60
End: 2025-08-05
Payer: COMMERCIAL

## 2025-08-05 ENCOUNTER — TELEPHONE (OUTPATIENT)
Dept: UROLOGY | Age: 60
End: 2025-08-05
Payer: COMMERCIAL

## 2025-08-05 VITALS
SYSTOLIC BLOOD PRESSURE: 126 MMHG | HEART RATE: 102 BPM | WEIGHT: 128.97 LBS | HEIGHT: 61 IN | BODY MASS INDEX: 24.35 KG/M2 | OXYGEN SATURATION: 97 % | DIASTOLIC BLOOD PRESSURE: 77 MMHG | RESPIRATION RATE: 16 BRPM | TEMPERATURE: 97.9 F

## 2025-08-05 LAB
ALBUMIN SERPL-MCNC: 4.4 G/DL (ref 3.5–5.2)
ALBUMIN/GLOB SERPL: 1.3 G/DL
ALP SERPL-CCNC: 160 U/L (ref 39–117)
ALT SERPL W P-5'-P-CCNC: 46 U/L (ref 1–41)
ANION GAP SERPL CALCULATED.3IONS-SCNC: 11.8 MMOL/L (ref 5–15)
AST SERPL-CCNC: 38 U/L (ref 1–40)
BACTERIA UR QL AUTO: ABNORMAL /HPF
BASOPHILS # BLD AUTO: 0.08 10*3/MM3 (ref 0–0.2)
BASOPHILS NFR BLD AUTO: 0.7 % (ref 0–1.5)
BILIRUB SERPL-MCNC: 0.3 MG/DL (ref 0–1.2)
BILIRUB UR QL STRIP: NEGATIVE
BUN SERPL-MCNC: 11.2 MG/DL (ref 8–23)
BUN/CREAT SERPL: 9.8 (ref 7–25)
CALCIUM SPEC-SCNC: 9.6 MG/DL (ref 8.6–10.5)
CHLORIDE SERPL-SCNC: 101 MMOL/L (ref 98–107)
CLARITY UR: CLEAR
CO2 SERPL-SCNC: 27.2 MMOL/L (ref 22–29)
COLOR UR: YELLOW
CREAT SERPL-MCNC: 1.14 MG/DL (ref 0.76–1.27)
D-LACTATE SERPL-SCNC: 1.7 MMOL/L (ref 0.5–2)
DEPRECATED RDW RBC AUTO: 42.1 FL (ref 37–54)
EGFRCR SERPLBLD CKD-EPI 2021: 72.7 ML/MIN/1.73
EOSINOPHIL # BLD AUTO: 0.3 10*3/MM3 (ref 0–0.4)
EOSINOPHIL NFR BLD AUTO: 2.6 % (ref 0.3–6.2)
ERYTHROCYTE [DISTWIDTH] IN BLOOD BY AUTOMATED COUNT: 12.9 % (ref 12.3–15.4)
GLOBULIN UR ELPH-MCNC: 3.5 GM/DL
GLUCOSE SERPL-MCNC: 113 MG/DL (ref 65–99)
GLUCOSE UR STRIP-MCNC: NEGATIVE MG/DL
HCT VFR BLD AUTO: 41.1 % (ref 37.5–51)
HGB BLD-MCNC: 13.6 G/DL (ref 13–17.7)
HGB UR QL STRIP.AUTO: ABNORMAL
HOLD SPECIMEN: NORMAL
HOLD SPECIMEN: NORMAL
HYALINE CASTS UR QL AUTO: ABNORMAL /LPF
IMM GRANULOCYTES # BLD AUTO: 0.08 10*3/MM3 (ref 0–0.05)
IMM GRANULOCYTES NFR BLD AUTO: 0.7 % (ref 0–0.5)
KETONES UR QL STRIP: NEGATIVE
LEUKOCYTE ESTERASE UR QL STRIP.AUTO: ABNORMAL
LIPASE SERPL-CCNC: 28 U/L (ref 13–60)
LYMPHOCYTES # BLD AUTO: 1.64 10*3/MM3 (ref 0.7–3.1)
LYMPHOCYTES NFR BLD AUTO: 14.1 % (ref 19.6–45.3)
MCH RBC QN AUTO: 29.6 PG (ref 26.6–33)
MCHC RBC AUTO-ENTMCNC: 33.1 G/DL (ref 31.5–35.7)
MCV RBC AUTO: 89.3 FL (ref 79–97)
MONOCYTES # BLD AUTO: 0.98 10*3/MM3 (ref 0.1–0.9)
MONOCYTES NFR BLD AUTO: 8.4 % (ref 5–12)
NEUTROPHILS NFR BLD AUTO: 73.5 % (ref 42.7–76)
NEUTROPHILS NFR BLD AUTO: 8.55 10*3/MM3 (ref 1.7–7)
NITRITE UR QL STRIP: NEGATIVE
NRBC BLD AUTO-RTO: 0 /100 WBC (ref 0–0.2)
PH UR STRIP.AUTO: 6.5 [PH] (ref 5–8)
PLATELET # BLD AUTO: 400 10*3/MM3 (ref 140–450)
PMV BLD AUTO: 9.5 FL (ref 6–12)
POTASSIUM SERPL-SCNC: 3.9 MMOL/L (ref 3.5–5.2)
PROT SERPL-MCNC: 7.9 G/DL (ref 6–8.5)
PROT UR QL STRIP: NEGATIVE
RBC # BLD AUTO: 4.6 10*6/MM3 (ref 4.14–5.8)
RBC # UR STRIP: ABNORMAL /HPF
REF LAB TEST METHOD: ABNORMAL
SODIUM SERPL-SCNC: 140 MMOL/L (ref 136–145)
SP GR UR STRIP: <=1.005 (ref 1–1.03)
SQUAMOUS #/AREA URNS HPF: ABNORMAL /HPF
UROBILINOGEN UR QL STRIP: ABNORMAL
WBC # UR STRIP: ABNORMAL /HPF
WBC NRBC COR # BLD AUTO: 11.63 10*3/MM3 (ref 3.4–10.8)
WHOLE BLOOD HOLD COAG: NORMAL
WHOLE BLOOD HOLD SPECIMEN: NORMAL

## 2025-08-05 PROCEDURE — 25010000002 CEFTRIAXONE PER 250 MG: Performed by: EMERGENCY MEDICINE

## 2025-08-05 PROCEDURE — 83690 ASSAY OF LIPASE: CPT | Performed by: EMERGENCY MEDICINE

## 2025-08-05 PROCEDURE — 87077 CULTURE AEROBIC IDENTIFY: CPT | Performed by: EMERGENCY MEDICINE

## 2025-08-05 PROCEDURE — 83605 ASSAY OF LACTIC ACID: CPT | Performed by: EMERGENCY MEDICINE

## 2025-08-05 PROCEDURE — 96365 THER/PROPH/DIAG IV INF INIT: CPT

## 2025-08-05 PROCEDURE — 87086 URINE CULTURE/COLONY COUNT: CPT | Performed by: EMERGENCY MEDICINE

## 2025-08-05 PROCEDURE — 85025 COMPLETE CBC W/AUTO DIFF WBC: CPT | Performed by: EMERGENCY MEDICINE

## 2025-08-05 PROCEDURE — 74176 CT ABD & PELVIS W/O CONTRAST: CPT

## 2025-08-05 PROCEDURE — 87186 SC STD MICRODIL/AGAR DIL: CPT | Performed by: EMERGENCY MEDICINE

## 2025-08-05 PROCEDURE — 81001 URINALYSIS AUTO W/SCOPE: CPT | Performed by: EMERGENCY MEDICINE

## 2025-08-05 PROCEDURE — 51798 US URINE CAPACITY MEASURE: CPT

## 2025-08-05 PROCEDURE — 80053 COMPREHEN METABOLIC PANEL: CPT | Performed by: EMERGENCY MEDICINE

## 2025-08-05 RX ORDER — SODIUM CHLORIDE 0.9 % (FLUSH) 0.9 %
10 SYRINGE (ML) INJECTION AS NEEDED
Status: DISCONTINUED | OUTPATIENT
Start: 2025-08-05 | End: 2025-08-05 | Stop reason: HOSPADM

## 2025-08-05 RX ORDER — CEPHALEXIN 500 MG/1
500 CAPSULE ORAL 4 TIMES DAILY
Qty: 28 CAPSULE | Refills: 0 | Status: SHIPPED | OUTPATIENT
Start: 2025-08-05 | End: 2025-08-12

## 2025-08-05 RX ADMIN — CEFTRIAXONE SODIUM 1000 MG: 1 INJECTION, POWDER, FOR SOLUTION INTRAMUSCULAR; INTRAVENOUS at 02:02

## 2025-08-06 ENCOUNTER — RESULTS FOLLOW-UP (OUTPATIENT)
Dept: EMERGENCY DEPT | Facility: HOSPITAL | Age: 60
End: 2025-08-06
Payer: COMMERCIAL

## 2025-08-07 LAB — BACTERIA SPEC AEROBE CULT: ABNORMAL

## 2025-08-07 RX ORDER — SULFAMETHOXAZOLE AND TRIMETHOPRIM 800; 160 MG/1; MG/1
1 TABLET ORAL 2 TIMES DAILY
Qty: 6 TABLET | Refills: 0 | Status: SHIPPED | OUTPATIENT
Start: 2025-08-07 | End: 2025-08-10

## 2025-08-08 ENCOUNTER — OFFICE VISIT (OUTPATIENT)
Dept: UROLOGY | Age: 60
End: 2025-08-08
Payer: COMMERCIAL

## 2025-08-08 VITALS — BODY MASS INDEX: 24.17 KG/M2 | HEIGHT: 61 IN | WEIGHT: 128 LBS

## 2025-08-08 DIAGNOSIS — C61 PROSTATE CANCER: Primary | ICD-10-CM

## 2025-08-08 DIAGNOSIS — N30.01 ACUTE CYSTITIS WITH HEMATURIA: ICD-10-CM

## 2025-08-08 DIAGNOSIS — R33.9 URINARY RETENTION: ICD-10-CM

## 2025-08-08 PROCEDURE — 99024 POSTOP FOLLOW-UP VISIT: CPT | Performed by: UROLOGY

## 2025-08-11 LAB — URINE VOLUME: NORMAL

## 2025-08-20 ENCOUNTER — OFFICE VISIT (OUTPATIENT)
Dept: UROLOGY | Age: 60
End: 2025-08-20
Payer: COMMERCIAL

## 2025-08-20 VITALS — BODY MASS INDEX: 24.17 KG/M2 | WEIGHT: 128 LBS | HEIGHT: 61 IN

## 2025-08-20 DIAGNOSIS — C61 PROSTATE CANCER: Primary | ICD-10-CM

## 2025-08-20 LAB
BILIRUB BLD-MCNC: NEGATIVE MG/DL
CLARITY, POC: CLEAR
COLOR UR: YELLOW
EXPIRATION DATE: NORMAL
GLUCOSE UR STRIP-MCNC: NEGATIVE MG/DL
KETONES UR QL: NEGATIVE
LEUKOCYTE EST, POC: NEGATIVE
Lab: NORMAL
NITRITE UR-MCNC: NEGATIVE MG/ML
PH UR: 6.5 [PH] (ref 5–8)
PROT UR STRIP-MCNC: NEGATIVE MG/DL
RBC # UR STRIP: NEGATIVE /UL
SP GR UR: 1.02 (ref 1–1.03)
URINE VOLUME: NORMAL
UROBILINOGEN UR QL: NORMAL

## 2025-08-20 PROCEDURE — 51798 US URINE CAPACITY MEASURE: CPT | Performed by: UROLOGY

## 2025-08-20 PROCEDURE — 99024 POSTOP FOLLOW-UP VISIT: CPT | Performed by: UROLOGY

## 2025-08-20 PROCEDURE — 81003 URINALYSIS AUTO W/O SCOPE: CPT | Performed by: UROLOGY

## (undated) DEVICE — SYR LUERLOK 30CC

## (undated) DEVICE — GUIDE 1/NDL BIOP ULTRASND DISP

## (undated) DEVICE — TISSUE RETRIEVAL SYSTEM: Brand: INZII RETRIEVAL SYSTEM

## (undated) DEVICE — PENCL SMOKE/EVAC MEGADYNE TELESCP 10FT

## (undated) DEVICE — HDLR PROB ULTRASND URONAV DISP

## (undated) DEVICE — 2, DISPOSABLE SUCTION/IRRIGATOR WITHOUT DISPOSABLE TIP: Brand: STRYKEFLOW

## (undated) DEVICE — STERILE POLYISOPRENE POWDER-FREE SURGICAL GLOVES: Brand: PROTEXIS

## (undated) DEVICE — Device

## (undated) DEVICE — ANTIBACTERIAL VIOLET BRAIDED (POLYGLACTIN 910), SYNTHETIC ABSORBABLE SUTURE: Brand: COATED VICRYL

## (undated) DEVICE — SHEET,DRAPE,70X85,STERILE: Brand: MEDLINE

## (undated) DEVICE — TROCAR: Brand: KII FIOS FIRST ENTRY

## (undated) DEVICE — SKIN PREP TRAY W/CHG: Brand: MEDLINE INDUSTRIES, INC.

## (undated) DEVICE — NON-WOVEN ADHESIVE WOUND DRESSING: Brand: PRIMAPORE ADHESIVE DRESSING 10*8CM

## (undated) DEVICE — THE STERILE LIGHT HANDLE COVER IS USED WITH STERIS SURGICAL LIGHTING AND VISUALIZATION SYSTEMS.

## (undated) DEVICE — TUBING, SUCTION, 1/4" X 10', STRAIGHT: Brand: MEDLINE

## (undated) DEVICE — SUT VIC PLS CTD BR 0 TIE 18IN VIL

## (undated) DEVICE — SOL IRR H2O BTL 1000ML STRL

## (undated) DEVICE — MARKER,SKIN,WI/RULER AND LABELS: Brand: MEDLINE

## (undated) DEVICE — Device: Brand: DEFENDO AIR/WATER/SUCTION AND BIOPSY VALVE

## (undated) DEVICE — TOWEL,OR,DSP,ST,BLUE,STD,4/PK,20PK/CS: Brand: MEDLINE

## (undated) DEVICE — CATHETER,FOLEY,100%SILICONE,20FR,10ML,LF: Brand: MEDLINE

## (undated) DEVICE — SUT VIC 0 UR6 27IN VCP603H

## (undated) DEVICE — VESSEL SEALER EXTEND: Brand: ENDOWRIST

## (undated) DEVICE — GLOVE,SURG,SENSICARE SLT,LF,PF,6.5: Brand: MEDLINE

## (undated) DEVICE — ARM DRAPE

## (undated) DEVICE — SEAL

## (undated) DEVICE — DAVINCI-LF: Brand: MEDLINE INDUSTRIES, INC.

## (undated) DEVICE — GAMMEX® NON-LATEX SIZE 7.5, STERILE NEOPRENE POWDER-FREE SURGICAL GLOVE: Brand: GAMMEX

## (undated) DEVICE — LINER SURG CANSTR SXN S/RIGD 1500CC

## (undated) DEVICE — SLV SCD KN/LEN ADJ EXPRSS BLENDED MD 1P/U

## (undated) DEVICE — TIP COVER ACCESSORY

## (undated) DEVICE — CONN JET HYDRA H20 AUXILIARY DISP

## (undated) DEVICE — SOL IRRG H2O PL/BG 1000ML STRL

## (undated) DEVICE — DRSNG TELFA PAD NONADH STR 1S 3X4IN

## (undated) DEVICE — LAPAROSCOPIC TROCAR SLEEVE/SINGLE USE: Brand: KII® OPTICAL ACCESS SYSTEM

## (undated) DEVICE — LAPAROSCOPIC SCISSORS: Brand: EPIX LAPAROSCOPIC SCISSORS

## (undated) DEVICE — MAX-CORE® DISPOSABLE CORE BIOPSY INSTRUMENT, 18G X 25CM: Brand: MAX-CORE

## (undated) DEVICE — STERILE POLYISOPRENE POWDER-FREE SURGICAL GLOVES WITH EMOLLIENT COATING: Brand: PROTEXIS

## (undated) DEVICE — SOLIDIFIER LIQLOC PLS 1500CC BT

## (undated) DEVICE — SOL IRR NACL 0.9PCT BO 1000ML

## (undated) DEVICE — METER,URINE,400ML,DRAIN BAG,L/F,LL,SLIDE: Brand: MEDLINE

## (undated) DEVICE — CVR PROB ULTRASND DISP LF

## (undated) DEVICE — CATH FOL BARDEX IC 2WY 22F 5CC

## (undated) DEVICE — NON-WOVEN ADHESIVE WOUND DRESSING: Brand: PRIMAPORE ADHESIVE WOUND DRSG 7.2*5CM

## (undated) DEVICE — SUT MNCRYL PLS ANTIB UD 4/0 PS2 18IN

## (undated) DEVICE — SYR LL TP 10ML STRL